# Patient Record
Sex: FEMALE | Race: ASIAN | NOT HISPANIC OR LATINO | Employment: FULL TIME | URBAN - METROPOLITAN AREA
[De-identification: names, ages, dates, MRNs, and addresses within clinical notes are randomized per-mention and may not be internally consistent; named-entity substitution may affect disease eponyms.]

---

## 2018-06-13 ENCOUNTER — OFFICE VISIT (OUTPATIENT)
Dept: FAMILY MEDICINE CLINIC | Facility: CLINIC | Age: 23
End: 2018-06-13
Payer: COMMERCIAL

## 2018-06-13 VITALS
HEART RATE: 86 BPM | DIASTOLIC BLOOD PRESSURE: 70 MMHG | BODY MASS INDEX: 22.42 KG/M2 | RESPIRATION RATE: 18 BRPM | TEMPERATURE: 98.5 F | HEIGHT: 59 IN | SYSTOLIC BLOOD PRESSURE: 106 MMHG | WEIGHT: 111.2 LBS

## 2018-06-13 DIAGNOSIS — Z11.3 SCREEN FOR STD (SEXUALLY TRANSMITTED DISEASE): ICD-10-CM

## 2018-06-13 DIAGNOSIS — B37.3 CANDIDA VAGINITIS: Primary | ICD-10-CM

## 2018-06-13 DIAGNOSIS — N89.8 VAGINAL DISCHARGE: ICD-10-CM

## 2018-06-13 PROBLEM — B37.9 CANDIDIASIS: Status: ACTIVE | Noted: 2018-06-13

## 2018-06-13 PROCEDURE — 87591 N.GONORRHOEAE DNA AMP PROB: CPT | Performed by: FAMILY MEDICINE

## 2018-06-13 PROCEDURE — 1036F TOBACCO NON-USER: CPT | Performed by: FAMILY MEDICINE

## 2018-06-13 PROCEDURE — 87491 CHLMYD TRACH DNA AMP PROBE: CPT | Performed by: FAMILY MEDICINE

## 2018-06-13 PROCEDURE — 99213 OFFICE O/P EST LOW 20 MIN: CPT | Performed by: FAMILY MEDICINE

## 2018-06-13 PROCEDURE — 3008F BODY MASS INDEX DOCD: CPT | Performed by: FAMILY MEDICINE

## 2018-06-13 PROCEDURE — 88175 CYTOPATH C/V AUTO FLUID REDO: CPT | Performed by: FAMILY MEDICINE

## 2018-06-13 RX ORDER — FLUCONAZOLE 150 MG/1
150 TABLET ORAL ONCE
Qty: 2 TABLET | Refills: 0 | Status: SHIPPED | OUTPATIENT
Start: 2018-06-13 | End: 2018-06-13

## 2018-06-13 NOTE — PATIENT INSTRUCTIONS
Take 1 dose of Diflucan today, and take an additional pill in 5 days  Please follow-up if symptoms worsen or do not improve

## 2018-06-13 NOTE — ASSESSMENT & PLAN NOTE
- Diflucan 150 mg once, and then repeat dose in 5 days   Advised to call if symptoms do not improve or worsen  - Discussed routine hygiene, advised to wear breathable, cotton underwear

## 2018-06-15 LAB
CHLAMYDIA DNA CVX QL NAA+PROBE: NORMAL
N GONORRHOEA DNA GENITAL QL NAA+PROBE: NORMAL

## 2018-06-18 LAB
LAB AP GYN PRIMARY INTERPRETATION: NORMAL
LAB AP LMP: NORMAL
Lab: NORMAL
PATH INTERP SPEC-IMP: NORMAL

## 2019-11-18 ENCOUNTER — HOSPITAL ENCOUNTER (EMERGENCY)
Facility: HOSPITAL | Age: 24
Discharge: HOME | End: 2019-11-18
Attending: EMERGENCY MEDICINE | Admitting: EMERGENCY MEDICINE
Payer: COMMERCIAL

## 2019-11-18 VITALS
BODY MASS INDEX: 22.52 KG/M2 | OXYGEN SATURATION: 100 % | HEART RATE: 109 BPM | SYSTOLIC BLOOD PRESSURE: 129 MMHG | TEMPERATURE: 99.3 F | WEIGHT: 110 LBS | DIASTOLIC BLOOD PRESSURE: 67 MMHG | RESPIRATION RATE: 18 BRPM

## 2019-11-18 DIAGNOSIS — N39.0 ACUTE URINARY TRACT INFECTION: Primary | ICD-10-CM

## 2019-11-18 LAB
BACTERIA UR QL AUTO: ABNORMAL /HPF
BILIRUB UR QL STRIP: NEGATIVE
CLARITY UR: ABNORMAL
COLOR UR: YELLOW
EXT PREG TEST URINE: NEGATIVE
EXT. CONTROL ED NAV: NORMAL
GLUCOSE UR STRIP-MCNC: NEGATIVE MG/DL
HGB UR QL STRIP.AUTO: ABNORMAL
KETONES UR STRIP-MCNC: NEGATIVE MG/DL
LEUKOCYTE ESTERASE UR QL STRIP: ABNORMAL
NITRITE UR QL STRIP: NEGATIVE
NON-SQ EPI CELLS URNS QL MICRO: ABNORMAL /HPF
PH UR STRIP.AUTO: 7.5 [PH] (ref 4.5–8)
PROT UR STRIP-MCNC: NEGATIVE MG/DL
RBC #/AREA URNS AUTO: ABNORMAL /HPF
SP GR UR STRIP.AUTO: 1.01 (ref 1–1.03)
UROBILINOGEN UR QL STRIP.AUTO: 0.2 E.U./DL
WBC #/AREA URNS AUTO: ABNORMAL /HPF

## 2019-11-18 PROCEDURE — 87591 N.GONORRHOEAE DNA AMP PROB: CPT | Performed by: PHYSICIAN ASSISTANT

## 2019-11-18 PROCEDURE — 99283 EMERGENCY DEPT VISIT LOW MDM: CPT

## 2019-11-18 PROCEDURE — 81001 URINALYSIS AUTO W/SCOPE: CPT

## 2019-11-18 PROCEDURE — 99284 EMERGENCY DEPT VISIT MOD MDM: CPT | Performed by: PHYSICIAN ASSISTANT

## 2019-11-18 PROCEDURE — 87491 CHLMYD TRACH DNA AMP PROBE: CPT | Performed by: PHYSICIAN ASSISTANT

## 2019-11-18 PROCEDURE — 81025 URINE PREGNANCY TEST: CPT | Performed by: PHYSICIAN ASSISTANT

## 2019-11-18 RX ORDER — NITROFURANTOIN 25; 75 MG/1; MG/1
100 CAPSULE ORAL EVERY 12 HOURS SCHEDULED
Qty: 10 CAPSULE | Refills: 0 | Status: SHIPPED | OUTPATIENT
Start: 2019-11-18 | End: 2019-11-23

## 2019-11-18 NOTE — DISCHARGE INSTRUCTIONS
Nhiê?m josselyn?ng Tiê?t niê?u ? ? Alex? n??   ? I?U TR? MICHELLE? I TRÚ:   Nhiê?m josselyn?ng ? ??ng tiê?t niê?u (NTTN)  là do vi khu?n xâm nh?p vào ? ??ng ti?t ni?u c?a b?n gây ra  H?u h?t vi khu?n xâm nh?p vào ? ??ng ti?t ni?u s? thoát ra ngoài khi b?n ?i ti?u  N?u vi khu?n v?n còn l?i saad ? ??ng ti?t ni?u, b?n có th? b? viêm  ? ? ??ng tiê?t niê?u maddi gô?m thâ?n, niê?u lu?n, ba?ng carmela va? niê?u ?a?o  N???c tiê?u ? ? ??c ta?o ra ?? thâ?n va? wing?y t?? niê?u lu?n ?ê?n ba?ng carmela  N??c ti?u thoát ra kh?i bàng carmela lu ni?u ??o  NTTN xa?y ra phô? biê?n h?n ?? ????ng tiê?t niê?u d???i, maddi gô?m ba?ng carmela va? niê?u ?a?o  Các tri?u ch?ng ph? bi?n maddi g?m nh? dominick:   · ?i ti?u amari?u h?n bình th??ng ho?c ?i ti?u saad khi ?ang ng? · ?au ho?c rát khi b?n ?i ti?u    · ?au ho?c t?c b?ng d??i     · N??c ti?u có mùi hôi    · Có máu saad n??c ti?u    · Són ti?u  Yêu c?u ch?m sóc ngay n?u:   · Ba?n ?i tiê?u râ?t i?t ho??c hâ?u nh? không ?i tiê?u  · Ba?n bi? sô?t monreal va? ru?ng mi?nh      · Ba?n bi? ?au s???n ho??c ?au l?ng jarrett?y ca? ng trâ?m tro?ng  Liên h? v?i nhà cung c?p d?ch v? ch?m sóc s?c kh?e c?a b?n n?u:   · Ba?n bi? sô?t     · B?n không c?m th?y khá h?n dominick 2 ngày dùng kháng sinh  · B?n nôn m?a      · B?n có th?c m?c ho?c lo ng?i v? b?nh lý ho?c vi?c ch?m sóc   ?i?u tr? NTTN  có th? maddi g?m các thu?c ?? ?i?u tr? amari?m khu?n  B?n c?ng có th? c?n thu?c ?? gi?m ?au và rát ho?c gi?m c?m giác mót ti?u th??ng xuyên  Phòng tránh NTTN:   · Th??ng xuyên ?i ti?u h?t   ?i ti?u và ti?u h?t ngay khi b?n th?y c?n  Không nh?n ti?u saad th?i shahzad dài  · [place] Deter t? tr??c ra dominick dominick khi b?n ti?u ti?n ho?c ?? i ti?n   Vi?c này giúp ng?n không cho m?m b?nh charlotte vào ???ng ti?u lu ni?u ?? o     · U?ng n??c alise ch? d?n  H?i xem b?n nên u?ng maddi nhiêu n? ?c m?i ngày và n? ?c gì t?t nh?t cho b?n  B?n có th? c?n u?ng amari?u n??c h?n bình th??ng ?? giúp ? ào th?i vi Larwance Saas u?ng r??u, cafêin ho?c n??c ép cam quýt   Nh?ng ch?t này có th? gây kích ?ng bàng carmela và t?ng các tri?u ch?ng c?a b?n  Nhà cung c?p d?ch v? ch?m sóc s?c kh?e c?a b?n có th? khuyên b?n u?ng n??c ép nam vi?t qu?t ?? giúp ng?n ch?n NTTN  · ?i ti?u yaima khi leigh ann h? tình d?c  Cách này có th? giúp ? ào th?i vi khu?n ? ??c truy? n saad khi leigh ann h? tình d?c     · Không th?t r?a ho?c s? d?ng n??c kh? mùi dành cho ph? n?   Vi?c này có th? làm thay ??i cân b?ng hóa h?c saad âm ? ?o c?a b?n     · Th??ng xuyên thay b?ng v? sinh ho?c tampon  Vi?c này giúp ng?n không cho m?m b?nh charlotte vào ???ng ti?u  · Th?c hi?n t?p luy?n c? x? ?ng ch?u th??ng xumarzena  Bài t?p c? x? ?ng ch?u có th? giúp b?n b?t ? ?u và d?ng ti?u  C? x??ng ch?u kh?e có th? giúp b?n ?i ti?u h?t d? dàng h?n  Si?t ch?t các c? này l?i saad 5 giây gi?ng nh? b?n ?ang nh?n ti?u  Yaima ?ó th? l?ng 5 giây  D?n d?n t?p si?t t?ng lên 10 giây  M?i ngày th?c hi?n 3 l?n, m?i l?n 15 nh?p ho?c alise ch? d?n  Tái khám v?i nhà cung c?p d?ch v? ch?m sóc s?c kh?e c?a b?n alise ch? d?n:  Ghi l?i câu h?i c?a b?n ?? b?n nh? h?i saad cu?c th?m thalia  © 2017 [employer] Information is for End User's use only and may not be sold, redistributed or otherwise used for commercial purposes  All illustrations and images included in CareNotes® are the copyrighted property of A D [employer]  or Agustín Horan  The above information is an  only  It is not intended as medical advice for individual conditions or treatments  Talk to your doctor, nurse or pharmacist before following any medical regimen to see if it is safe and effective for you

## 2019-11-18 NOTE — ED PROVIDER NOTES
History  Chief Complaint   Patient presents with    Possible UTI     Blood in urine and urgency for about 2 weeks  Pt denies burning  Cem Casas is a 21 y o  female who presents to the ED with complaints of urinary frequency, urinary urgency hematuria over the past week  Patient states she is sexually active but is not concerned for sexually transmitted infection  Denies abdominal pain, nausea, vomiting, diarrhea, vaginal discharge, vaginal bleeding, vaginal pain, pelvic pain, pain with intercourse, chest pain, shortness of breath, fever, chills  Patient states she took "natural supplements" without alleviation of symptoms  Denies new lubricants/lotions/detergents/soaps/feminine products  History provided by:  Patient  Difficulty Urinating   Urinary symptoms: frequent urination, hematuria and hesitancy    Urinary symptoms: no discolored urine, no foul-smelling urine and no bladder incontinence    Associated symptoms: no abdominal pain, no fever, no flank pain, no genital lesions, no nausea, no vaginal discharge and no vomiting    Risk factors: sexually active    Risk factors: no hx of pyelonephritis, not pregnant and no recurrent urinary tract infections        Prior to Admission Medications   Prescriptions Last Dose Informant Patient Reported? Taking? Multiple Vitamin (MULTI-VITAMINS PO)  Self Yes No   Sig: Take 1 tablet by mouth daily      Facility-Administered Medications: None       History reviewed  No pertinent past medical history  Past Surgical History:   Procedure Laterality Date    ABCESS DRAINAGE      Appendical Abscess       Family History   Problem Relation Age of Onset    Asthma Father      I have reviewed and agree with the history as documented      Social History     Tobacco Use    Smoking status: Never Smoker    Smokeless tobacco: Never Used   Substance Use Topics    Alcohol use: No    Drug use: No        Review of Systems   Constitutional: Negative for appetite change, chills, fever and unexpected weight change  HENT: Negative for congestion, drooling, ear pain, rhinorrhea, sore throat, trouble swallowing and voice change  Eyes: Negative for pain, discharge, redness and visual disturbance  Respiratory: Negative for cough, shortness of breath, wheezing and stridor  Cardiovascular: Negative for chest pain, palpitations and leg swelling  Gastrointestinal: Negative for abdominal pain, blood in stool, constipation, diarrhea, nausea and vomiting  Genitourinary: Positive for decreased urine volume, difficulty urinating, frequency and urgency  Negative for dyspareunia, dysuria, enuresis, flank pain, genital sores, hematuria, menstrual problem, pelvic pain, vaginal bleeding, vaginal discharge and vaginal pain  Musculoskeletal: Negative for gait problem, joint swelling, neck pain and neck stiffness  Skin: Negative for color change and rash  Neurological: Negative for dizziness, seizures, light-headedness and headaches  Physical Exam  Physical Exam   Constitutional: She is oriented to person, place, and time  She appears well-developed and well-nourished  HENT:   Head: Normocephalic and atraumatic  Nose: Nose normal    Mouth/Throat: Oropharynx is clear and moist    Eyes: Pupils are equal, round, and reactive to light  Conjunctivae and EOM are normal    Cardiovascular: Normal rate, regular rhythm and intact distal pulses  Pulmonary/Chest: Effort normal and breath sounds normal    Abdominal: Normal appearance and bowel sounds are normal  There is no tenderness  There is no CVA tenderness  Musculoskeletal: Normal range of motion  Neurological: She is alert and oriented to person, place, and time  Skin: Skin is warm and dry  Capillary refill takes less than 2 seconds  Psychiatric: She has a normal mood and affect  Nursing note and vitals reviewed        Vital Signs  ED Triage Vitals [11/18/19 1211]   Temperature Pulse Respirations Blood Pressure SpO2 99 3 °F (37 4 °C) (!) 109 18 129/67 100 %      Temp Source Heart Rate Source Patient Position - Orthostatic VS BP Location FiO2 (%)   Oral Monitor Sitting Right arm --      Pain Score       No Pain           Vitals:    11/18/19 1211   BP: 129/67   Pulse: (!) 109   Patient Position - Orthostatic VS: Sitting         Visual Acuity      ED Medications  Medications - No data to display    Diagnostic Studies  Results Reviewed     Procedure Component Value Units Date/Time    Urine Microscopic [[id number]]  (Abnormal) Collected:  11/18/19 1258    Lab Status:  Final result Specimen:  Urine Updated:  11/18/19 1342     RBC, UA 20-30 /hpf      WBC, UA 2-4 /hpf      Epithelial Cells Occasional /hpf      Bacteria, UA Occasional /hpf     Chlamydia/GC amplified DNA by PCR [[id number]] Collected:  11/18/19 1301    Lab Status: In process Specimen:  Urine, Other Updated:  11/18/19 1301    POCT pregnancy, urine [[id number]]  (Normal) Resulted:  11/18/19 1301    Lab Status:  Final result Updated:  11/18/19 1301     EXT PREG TEST UR (Ref: Negative) negative     Control valid    Urine Macroscopic, POC [[id number]]  (Abnormal) Collected:  11/18/19 1258    Lab Status:  Final result Specimen:  Urine Updated:  11/18/19 1259     Color, UA Yellow     Clarity, UA Cloudy     pH, UA 7 5     Leukocytes, UA Small     Nitrite, UA Negative     Protein, UA Negative mg/dl      Glucose, UA Negative mg/dl      Ketones, UA Negative mg/dl      Urobilinogen, UA 0 2 E U /dl      Bilirubin, UA Negative     Blood, UA Large     Specific Effie, UA 1 015    Narrative:       CLINITEK RESULT                 No orders to display              Procedures  Procedures       ED Course  ED Course as of Nov 18 1418   Mon Nov 18, 2019   1326 Educated patient regarding diagnosis and management  Advised patient to follow up with PCP  Advised patient to RTER for persistent or worsening symptoms                                     MDM  Number of Diagnoses or Management Options  Acute urinary tract infection: new and does not require workup  Diagnosis management comments: Lanre Saha is a 21 y o  female who presents to the ED with complaints of urinary frequency, urinary urgency hematuria over the past week  Patient states she is sexually active but is not concerned for sexually transmitted infection  Denies abdominal pain, nausea, vomiting, diarrhea, vaginal discharge, vaginal bleeding, chest pain, shortness of breath, fever, chills  Patient states she took "natural supplements" without alleviation of symptoms  Urinalysis with small leukocyte esterase and large blood  Symptomatic female  Will treat for urinary tract infection  Will send for testing for chlamydia or gonorrhea given sexually active status and lack of gynecological care  I provided patient with strict RTER precautions  I advised patient follow-up with PCP in 24-48 hours  Patient verbalized understanding  Amount and/or Complexity of Data Reviewed  Clinical lab tests: reviewed and ordered  Review and summarize past medical records: yes    Risk of Complications, Morbidity, and/or Mortality  Presenting problems: low  Diagnostic procedures: low  Management options: low    Patient Progress  Patient progress: improved      Disposition  Final diagnoses:   Acute urinary tract infection     Time reflects when diagnosis was documented in both MDM as applicable and the Disposition within this note     Time User Action Codes Description Comment    11/18/2019  1:08 PM Delorse Hodgkins Add [N39 0] Acute urinary tract infection       ED Disposition     ED Disposition Condition Date/Time Comment    Discharge Stable Mon Nov 18, 2019  1:11 PM Tayla Amador discharge to home/self care              Follow-up Information     Follow up With Specialties Details Why Contact Info Additional [address] Emergency Department Emergency Medicine Go to  If symptoms worsen Anna [address] [facility]  [phone] [facility] ED, [address], [place], [place], [address] [facility] Schedule an appointment as soon as possible for a visit   [address]  [id number] [phone] [facility], [address], [address] [phone]          Discharge Medication List as of 11/18/2019  1:11 PM      START taking these medications    Details   nitrofurantoin (MACROBID) 100 mg capsule Take 1 capsule (100 mg total) by mouth every 12 (twelve) hours for 5 days, Starting Mon 11/18/2019, Until Sat 11/23/2019, Print         CONTINUE these medications which have NOT CHANGED    Details   Multiple Vitamin (MULTI-VITAMINS PO) Take 1 tablet by mouth daily, Starting Tue 10/28/2014, Historical Med           No discharge procedures on file      ED Provider  Electronically Signed by           Harman Mcdonnell PA-C  11/18/19 2650

## 2019-11-19 LAB
C TRACH DNA SPEC QL NAA+PROBE: NEGATIVE
N GONORRHOEA DNA SPEC QL NAA+PROBE: NEGATIVE

## 2020-08-06 ENCOUNTER — TELEPHONE (OUTPATIENT)
Dept: FAMILY MEDICINE CLINIC | Facility: CLINIC | Age: 25
End: 2020-08-06

## 2020-08-07 ENCOUNTER — OFFICE VISIT (OUTPATIENT)
Dept: FAMILY MEDICINE CLINIC | Facility: CLINIC | Age: 25
End: 2020-08-07

## 2020-08-07 VITALS
HEART RATE: 70 BPM | BODY MASS INDEX: 21.77 KG/M2 | DIASTOLIC BLOOD PRESSURE: 70 MMHG | TEMPERATURE: 99.1 F | WEIGHT: 108 LBS | RESPIRATION RATE: 16 BRPM | SYSTOLIC BLOOD PRESSURE: 118 MMHG | HEIGHT: 59 IN

## 2020-08-07 DIAGNOSIS — Z30.09 BIRTH CONTROL COUNSELING: Primary | ICD-10-CM

## 2020-08-07 DIAGNOSIS — Z30.42 DEPO-PROVERA CONTRACEPTIVE STATUS: ICD-10-CM

## 2020-08-07 LAB — SL AMB POCT URINE HCG: NEGATIVE

## 2020-08-07 PROCEDURE — 96372 THER/PROPH/DIAG INJ SC/IM: CPT

## 2020-08-07 PROCEDURE — 99213 OFFICE O/P EST LOW 20 MIN: CPT | Performed by: FAMILY MEDICINE

## 2020-08-07 PROCEDURE — 3008F BODY MASS INDEX DOCD: CPT | Performed by: FAMILY MEDICINE

## 2020-08-07 PROCEDURE — 81025 URINE PREGNANCY TEST: CPT | Performed by: FAMILY MEDICINE

## 2020-08-07 PROCEDURE — 1036F TOBACCO NON-USER: CPT | Performed by: FAMILY MEDICINE

## 2020-08-07 RX ORDER — MEDROXYPROGESTERONE ACETATE 150 MG/ML
150 INJECTION, SUSPENSION INTRAMUSCULAR ONCE
Status: COMPLETED | OUTPATIENT
Start: 2020-08-07 | End: 2020-08-07

## 2020-08-07 RX ADMIN — MEDROXYPROGESTERONE ACETATE 150 MG: 150 INJECTION, SUSPENSION INTRAMUSCULAR at 09:30

## 2020-08-07 NOTE — ASSESSMENT & PLAN NOTE
Patient reports need for contraception counseling   -Currently has a stable relationship with boyfriend  She uses barrier contraception (condoms) occasionally    -Counseled on different contraceptive methods: natural planning, barrier methods, Nexplanon, Depo shot, IUDs  She reports she would like to try Depo shot at this time  Side effects like spotting, intermenstrual bleeding, weight gain, etc discussed with patient   -Advised to use condoms to avoid STDs since this is only to prevent pregnancy    -Urine hCG negative at this visit  -Depo shot given   RTC in 3 months

## 2020-08-07 NOTE — PROGRESS NOTES
Assessment/Plan:    Birth control counseling  Patient reports need for contraception counseling   -Currently has a stable relationship with boyfriend  She uses barrier contraception (condoms) occasionally    -Counseled on different contraceptive methods: natural planning, barrier methods, Nexplanon, Depo shot, IUDs  She reports she would like to try Depo shot at this time  Side effects like spotting, intermenstrual bleeding, weight gain, etc discussed with patient   -Advised to use condoms to avoid STDs since this is only to prevent pregnancy    -Urine hCG negative at this visit  -Depo shot given  RTC in 3 months         Problem List Items Addressed This Visit        Other    Birth control counseling - Primary     Patient reports need for contraception counseling   -Currently has a stable relationship with boyfriend  She uses barrier contraception (condoms) occasionally    -Counseled on different contraceptive methods: natural planning, barrier methods, Nexplanon, Depo shot, IUDs  She reports she would like to try Depo shot at this time  Side effects like spotting, intermenstrual bleeding, weight gain, etc discussed with patient   -Advised to use condoms to avoid STDs since this is only to prevent pregnancy    -Urine hCG negative at this visit  -Depo shot given  RTC in 3 months         Relevant Medications    medroxyPROGESTERone (DEPO-PROVERA) IM injection 150 mg (Start on 8/7/2020 10:15 AM)    Other Relevant Orders    POCT urine HCG (Completed)      Other Visit Diagnoses     Depo-Provera contraceptive status        Relevant Medications    medroxyPROGESTERone (DEPO-PROVERA) IM injection 150 mg (Start on 8/7/2020 10:15 AM)            Subjective:      Patient ID: Amaury Loera is a 31 y o  female  HPI     Patient presents for birth control counseling  Patient reports having a stable relationship with boyfriend, used condoms in the past, but since she doesn't like it she states he pulls out prior to ejaculation   She didn't use any other methods in the past  She would like to avoid any intravaginal device  She read about Nexplanon but would like full counseling on other options  The following portions of the patient's history were reviewed and updated as appropriate:   She  has no past medical history on file  She  has a past surgical history that includes Abscess drainage  She  reports that she has never smoked  She has never used smokeless tobacco  She reports that she does not drink alcohol or use drugs  Current Outpatient Medications   Medication Sig Dispense Refill    Multiple Vitamin (MULTI-VITAMINS PO) Take 1 tablet by mouth daily       Current Facility-Administered Medications   Medication Dose Route Frequency Provider Last Rate Last Dose    medroxyPROGESTERone (DEPO-PROVERA) IM injection 150 mg  150 mg Intramuscular Once Kimberlee Harada, MD         She has No Known Allergies       Review of Systems   Constitutional: Negative for appetite change, fatigue and fever  Cardiovascular: Negative for chest pain  Gastrointestinal: Negative for abdominal distention, abdominal pain, constipation, diarrhea, nausea and vomiting  Genitourinary: Negative for difficulty urinating, frequency, menstrual problem and urgency  Musculoskeletal: Negative for back pain  Neurological: Negative for headaches  Psychiatric/Behavioral: The patient is not nervous/anxious  Objective:      /70 (BP Location: Left arm, Patient Position: Sitting, Cuff Size: Large)   Pulse 70   Temp 99 1 °F (37 3 °C) (Tympanic)   Resp 16   Ht 4' 11" (1 499 m)   Wt 49 kg (108 lb)   BMI 21 81 kg/m²          Physical Exam   Constitutional: She is oriented to person, place, and time  She appears well-developed  No distress  HENT:   Head: Normocephalic and atraumatic  Neck: Normal range of motion  Neck supple  No thyromegaly present  Cardiovascular: Normal rate, regular rhythm and normal heart sounds   Exam reveals no gallop and no friction rub  No murmur heard  Pulmonary/Chest: Effort normal and breath sounds normal  No respiratory distress  She has no wheezes  She has no rales  She exhibits no tenderness  Abdominal: Soft  Bowel sounds are normal  She exhibits no distension and no mass  There is no abdominal tenderness  There is no rebound and no guarding  Musculoskeletal: Normal range of motion  General: No tenderness or deformity  Lymphadenopathy:     She has no cervical adenopathy  Neurological: She is alert and oriented to person, place, and time  Skin: Skin is warm and dry  No rash noted  She is not diaphoretic  No erythema  Psychiatric: Her behavior is normal  Judgment and thought content normal    Vitals reviewed

## 2020-11-24 ENCOUNTER — OFFICE VISIT (OUTPATIENT)
Dept: FAMILY MEDICINE CLINIC | Facility: CLINIC | Age: 25
End: 2020-11-24

## 2020-11-24 VITALS
WEIGHT: 107 LBS | RESPIRATION RATE: 18 BRPM | TEMPERATURE: 99.5 F | HEART RATE: 110 BPM | DIASTOLIC BLOOD PRESSURE: 80 MMHG | HEIGHT: 59 IN | SYSTOLIC BLOOD PRESSURE: 112 MMHG | BODY MASS INDEX: 21.57 KG/M2

## 2020-11-24 DIAGNOSIS — R30.0 DYSURIA: Primary | ICD-10-CM

## 2020-11-24 DIAGNOSIS — N91.2 AMENORRHEA: ICD-10-CM

## 2020-11-24 DIAGNOSIS — B37.9 CANDIDIASIS: ICD-10-CM

## 2020-11-24 LAB
SL AMB  POCT GLUCOSE, UA: NEGATIVE
SL AMB LEUKOCYTE ESTERASE,UA: NEGATIVE
SL AMB POCT BILIRUBIN,UA: NEGATIVE
SL AMB POCT BLOOD,UA: ABNORMAL
SL AMB POCT CLARITY,UA: CLEAR
SL AMB POCT COLOR,UA: YELLOW
SL AMB POCT KETONES,UA: NEGATIVE
SL AMB POCT NITRITE,UA: NEGATIVE
SL AMB POCT PH,UA: 7.5
SL AMB POCT SPECIFIC GRAVITY,UA: 1.01
SL AMB POCT URINE HCG: NEGATIVE
SL AMB POCT URINE PROTEIN: NEGATIVE
SL AMB POCT UROBILINOGEN: 0.2

## 2020-11-24 PROCEDURE — 3725F SCREEN DEPRESSION PERFORMED: CPT | Performed by: FAMILY MEDICINE

## 2020-11-24 PROCEDURE — 81025 URINE PREGNANCY TEST: CPT | Performed by: FAMILY MEDICINE

## 2020-11-24 PROCEDURE — 81002 URINALYSIS NONAUTO W/O SCOPE: CPT | Performed by: FAMILY MEDICINE

## 2020-11-24 PROCEDURE — 3008F BODY MASS INDEX DOCD: CPT | Performed by: FAMILY MEDICINE

## 2020-11-24 PROCEDURE — 99213 OFFICE O/P EST LOW 20 MIN: CPT | Performed by: FAMILY MEDICINE

## 2020-11-24 PROCEDURE — 1036F TOBACCO NON-USER: CPT | Performed by: FAMILY MEDICINE

## 2020-11-24 RX ORDER — FLUCONAZOLE 150 MG/1
150 TABLET ORAL ONCE
Qty: 1 TABLET | Refills: 0 | Status: SHIPPED | OUTPATIENT
Start: 2020-11-24 | End: 2020-11-24

## 2020-12-30 ENCOUNTER — OFFICE VISIT (OUTPATIENT)
Dept: FAMILY MEDICINE CLINIC | Facility: CLINIC | Age: 25
End: 2020-12-30

## 2020-12-30 VITALS
TEMPERATURE: 98.3 F | OXYGEN SATURATION: 99 % | SYSTOLIC BLOOD PRESSURE: 120 MMHG | BODY MASS INDEX: 22.46 KG/M2 | DIASTOLIC BLOOD PRESSURE: 72 MMHG | HEART RATE: 85 BPM | RESPIRATION RATE: 16 BRPM | WEIGHT: 111.2 LBS

## 2020-12-30 DIAGNOSIS — B96.89 BACTERIAL VAGINOSIS: Primary | ICD-10-CM

## 2020-12-30 DIAGNOSIS — N76.0 BACTERIAL VAGINOSIS: Primary | ICD-10-CM

## 2020-12-30 DIAGNOSIS — N89.8 VAGINAL ITCHING: ICD-10-CM

## 2020-12-30 LAB
BV WHIFF TEST VAG QL: NORMAL
CLUE CELLS SPEC QL WET PREP: NEGATIVE
PH SMN: NORMAL [PH]
SL AMB POCT WET MOUNT: NEGATIVE
T VAGINALIS VAG QL WET PREP: NEGATIVE
YEAST VAG QL WET PREP: NEGATIVE

## 2020-12-30 PROCEDURE — 87210 SMEAR WET MOUNT SALINE/INK: CPT | Performed by: FAMILY MEDICINE

## 2020-12-30 PROCEDURE — 99214 OFFICE O/P EST MOD 30 MIN: CPT | Performed by: FAMILY MEDICINE

## 2020-12-30 PROCEDURE — 1036F TOBACCO NON-USER: CPT | Performed by: FAMILY MEDICINE

## 2020-12-30 RX ORDER — METRONIDAZOLE 500 MG/1
500 TABLET ORAL EVERY 12 HOURS SCHEDULED
Qty: 10 TABLET | Refills: 0 | Status: SHIPPED | OUTPATIENT
Start: 2020-12-30 | End: 2021-01-04

## 2021-01-04 ENCOUNTER — TELEMEDICINE (OUTPATIENT)
Dept: FAMILY MEDICINE CLINIC | Facility: CLINIC | Age: 26
End: 2021-01-04

## 2021-01-04 DIAGNOSIS — N76.0 BACTERIAL VAGINOSIS: ICD-10-CM

## 2021-01-04 DIAGNOSIS — N76.0 BACTERIAL VAGINOSIS: Primary | ICD-10-CM

## 2021-01-04 DIAGNOSIS — B96.89 BACTERIAL VAGINOSIS: ICD-10-CM

## 2021-01-04 DIAGNOSIS — B96.89 BACTERIAL VAGINOSIS: Primary | ICD-10-CM

## 2021-01-04 PROCEDURE — 99212 OFFICE O/P EST SF 10 MIN: CPT | Performed by: FAMILY MEDICINE

## 2021-01-04 RX ORDER — METRONIDAZOLE 500 MG/1
TABLET ORAL
Qty: 10 TABLET | Refills: 0 | OUTPATIENT
Start: 2021-01-04

## 2021-01-04 NOTE — ASSESSMENT & PLAN NOTE
Improving after 5 day course of Flagyl  - Discussed with pt that symptoms should continue to gradually improve over the next week  - If worsening of discharge or vaginal irritation advised pt to follow-up later this week for further workup/treatment

## 2021-01-04 NOTE — PROGRESS NOTES
Virtual Brief Visit    Assessment/Plan:    Problem List Items Addressed This Visit        Genitourinary    Bacterial vaginosis - Primary     Improving after 5 day course of Flagyl  - Discussed with pt that symptoms should continue to gradually improve over the next week  - If worsening of discharge or vaginal irritation advised pt to follow-up later this week for further workup/treatment                     Reason for visit is   Chief Complaint   Patient presents with   Julieann Frankel Virtual Brief Visit        Encounter provider Laura Fuentes MD    Provider located at [address] [address] [phone]    Recent Visits  Date Type Provider Dept   12/30/20 Office Visit Reina Vidal MD [facility] recent visits within past 7 days and meeting all other requirements     Today's Visits  Date Type Provider Dept   01/04/21 Telemedicine Nathalia Jerome 11 today's visits and meeting all other requirements     Future Appointments  No visits were found meeting these conditions  Showing future appointments within next 150 days and meeting all other requirements        After connecting through telephone, the patient was identified by name and date of birth  Aniceto Liao was informed that this is a telemedicine visit and that the visit is being conducted through telephone  My office door was closed  No one else was in the room  She acknowledged consent and understanding of privacy and security of the platform  The patient has agreed to participate and understands she can discontinue the visit at any time  Patient is aware this is a billable service  Wayne Iglesias is a 31 y o  female :  Pleasant 50-year-old female calling for follow-up of recent treatment of vaginal itchiness and irritation    She reports that after taking the Flagyl that most of her symptoms have improved and that she has been monitoring her feminine hygiene and washing with scent-free soap  She denies any symptoms of dysuria at this time  She says the vaginal discharge has greatly improved as well, but she still does have some vaginal discharge at this time  No past medical history on file  Past Surgical History:   Procedure Laterality Date    ABCESS DRAINAGE      Appendical Abscess       Current Outpatient Medications   Medication Sig Dispense Refill    metroNIDAZOLE (FLAGYL) 500 mg tablet Take 1 tablet (500 mg total) by mouth every 12 (twelve) hours for 5 days 10 tablet 0    Multiple Vitamin (MULTI-VITAMINS PO) Take 1 tablet by mouth daily       No current facility-administered medications for this visit  No Known Allergies    Review of Systems   Constitutional: Negative  HENT: Negative  Respiratory: Negative  Cardiovascular: Negative  Gastrointestinal: Negative  Genitourinary: Positive for vaginal discharge  Negative for difficulty urinating, dyspareunia, dysuria, hematuria, pelvic pain, vaginal bleeding and vaginal pain  Musculoskeletal: Negative for arthralgias and back pain  Neurological: Negative for dizziness, light-headedness and headaches  Psychiatric/Behavioral: The patient is not nervous/anxious  There were no vitals filed for this visit  It was my intent to perform this visit via video technology but the patient was not able to do a video connection so the visit was completed via audio telephone only  I spent 11 minutes directly with the patient during this visit    [place] acknowledges that she has consented to an online visit or consultation  She understands that the online visit is based solely on information provided by her, and that, in the absence of a face-to-face physical evaluation by the physician, the diagnosis she receives is both limited and provisional in terms of accuracy and completeness   This is not intended to replace a full medical face-to-face evaluation by the physician  Alejandrina Parada understands and accepts these terms

## 2021-05-08 ENCOUNTER — IMMUNIZATIONS (OUTPATIENT)
Dept: FAMILY MEDICINE CLINIC | Facility: HOSPITAL | Age: 26
End: 2021-05-08

## 2021-05-08 DIAGNOSIS — Z23 ENCOUNTER FOR IMMUNIZATION: Primary | ICD-10-CM

## 2021-05-08 PROCEDURE — 0001A SARS-COV-2 / COVID-19 MRNA VACCINE (PFIZER-BIONTECH) 30 MCG: CPT

## 2021-05-08 PROCEDURE — 91300 SARS-COV-2 / COVID-19 MRNA VACCINE (PFIZER-BIONTECH) 30 MCG: CPT

## 2021-05-29 ENCOUNTER — IMMUNIZATIONS (OUTPATIENT)
Dept: FAMILY MEDICINE CLINIC | Facility: HOSPITAL | Age: 26
End: 2021-05-29

## 2021-05-29 DIAGNOSIS — Z23 ENCOUNTER FOR IMMUNIZATION: Primary | ICD-10-CM

## 2021-05-29 PROCEDURE — 0002A SARS-COV-2 / COVID-19 MRNA VACCINE (PFIZER-BIONTECH) 30 MCG: CPT

## 2021-05-29 PROCEDURE — 91300 SARS-COV-2 / COVID-19 MRNA VACCINE (PFIZER-BIONTECH) 30 MCG: CPT

## 2022-08-24 NOTE — PROGRESS NOTES
Earnest Palmer 1995 female MRN: [id number]    Family Medicine Acute Visit    ASSESSMENT/PLAN  Problem List Items Addressed This Visit        Other    Candidiasis - Primary     - Diflucan 150 mg once, and then repeat dose in 5 days  Advised to call if symptoms do not improve or worsen  - Discussed routine hygiene, advised to wear breathable, cotton underwear           Relevant Medications    fluconazole (DIFLUCAN) 150 mg tablet      Other Visit Diagnoses     Vaginal discharge        Relevant Medications    fluconazole (DIFLUCAN) 150 mg tablet    Other Relevant Orders    Chlamydia/GC amplified DNA by PCR    Liquid-based pap, diagnostic    Screen for STD (sexually transmitted disease)        Relevant Orders    HIV 1/2 AG-AB combo    RPR                No future appointments  SUBJECTIVE  CC: Vaginal Discharge and Vaginal Itching      HPI:  Earnest Palmer is a 24 y o  female who presents for vaginal itching and discharge that began 2-3 days ago  She states that symptoms began as burning with urination and a feeling of irritation or swelling of her vaginal  Yesterday she noticed a clumpy white discharge from her vaginal  She states she has never had symptoms like this before  She states otherwise she is feeling well, denies fever or chills  She states she is sexually active and has never been tested for STDs  She has never had a Pap smear  Review of Systems   Constitutional: Negative for chills and fever  Gastrointestinal: Negative for abdominal pain  Genitourinary: Positive for dysuria and vaginal discharge  Negative for decreased urine volume, difficulty urinating, hematuria, pelvic pain and vaginal bleeding  Historical Information   The patient history was reviewed as follows:  History reviewed  No pertinent past medical history        Past Surgical History:   Procedure Laterality Date    ABCESS DRAINAGE      Appendical Abscess     Family History   Problem Relation Age of Onset    Asthma Father Social History   History   Alcohol Use No     History   Drug Use No     History   Smoking Status    Never Smoker   Smokeless Tobacco    Never Used       Medications:     Current Outpatient Prescriptions:     Multiple Vitamin (MULTI-VITAMINS PO), Take 1 tablet by mouth daily, Disp: , Rfl:     fluconazole (DIFLUCAN) 150 mg tablet, Take 1 tablet (150 mg total) by mouth once for 1 dose, Disp: 2 tablet, Rfl: 0    No Known Allergies    OBJECTIVE  Vitals:   Vitals:    06/13/18 1505   BP: 106/70   Pulse: 86   Resp: 18   Temp: 98 5 °F (36 9 °C)   Weight: 50 4 kg (111 lb 3 2 oz)   Height: 4' 10 6" (1 488 m)         Physical Exam   Constitutional: She is oriented to person, place, and time  She appears well-developed and well-nourished  No distress  Pulmonary/Chest: Effort normal    Genitourinary: Pelvic exam was performed with patient supine  There is no rash on the right labia  There is no rash on the left labia  Cervix exhibits no motion tenderness, no discharge and no friability  No erythema or bleeding in the vagina  No foreign body in the vagina  Vaginal discharge found  Genitourinary Comments: Clumpy white discharge present within vaginal canal   Neurological: She is alert and oriented to person, place, and time  She exhibits normal muscle tone  Skin: Skin is warm  She is not diaphoretic  Psychiatric: She has a normal mood and affect  Her behavior is normal  Thought content normal    Vitals reviewed       KOH mount- Numerous hyphae visualized with budding, suggestive of vaginal yeast infection     Bhavna Maldonado DO, PGY-1  [facility]   6/13/2018 Niacinamide Counseling: I recommended taking niacin or niacinamide, also know as vitamin B3, twice daily. Recent evidence suggests that taking vitamin B3 (500 mg twice daily) can reduce the risk of actinic keratoses and non-melanoma skin cancers. Side effects of vitamin B3 include flushing and headache.

## 2023-04-24 ENCOUNTER — ANNUAL EXAM (OUTPATIENT)
Dept: FAMILY MEDICINE CLINIC | Facility: CLINIC | Age: 28
End: 2023-04-24

## 2023-04-24 VITALS
HEART RATE: 109 BPM | HEIGHT: 59 IN | SYSTOLIC BLOOD PRESSURE: 112 MMHG | RESPIRATION RATE: 18 BRPM | TEMPERATURE: 99 F | WEIGHT: 115 LBS | BODY MASS INDEX: 23.18 KG/M2 | DIASTOLIC BLOOD PRESSURE: 78 MMHG | OXYGEN SATURATION: 100 %

## 2023-04-24 DIAGNOSIS — Z72.51 UNPROTECTED SEX: ICD-10-CM

## 2023-04-24 DIAGNOSIS — Z32.01 POSITIVE PREGNANCY TEST: Primary | ICD-10-CM

## 2023-04-24 DIAGNOSIS — Z12.4 CERVICAL CANCER SCREENING: ICD-10-CM

## 2023-04-24 DIAGNOSIS — Z11.3 ROUTINE SCREENING FOR STI (SEXUALLY TRANSMITTED INFECTION): ICD-10-CM

## 2023-04-24 DIAGNOSIS — Z3A.01 LESS THAN 8 WEEKS GESTATION OF PREGNANCY: ICD-10-CM

## 2023-04-24 LAB
BILIRUB UR QL STRIP: NEGATIVE
CLARITY UR: CLEAR
COLOR UR: COLORLESS
GLUCOSE UR STRIP-MCNC: NEGATIVE MG/DL
HGB UR QL STRIP.AUTO: NEGATIVE
KETONES UR STRIP-MCNC: NEGATIVE MG/DL
LEUKOCYTE ESTERASE UR QL STRIP: NEGATIVE
NITRITE UR QL STRIP: NEGATIVE
PH UR STRIP.AUTO: 6.5 [PH]
PROT UR STRIP-MCNC: NEGATIVE MG/DL
SL AMB POCT URINE HCG: POSITIVE
SP GR UR STRIP.AUTO: 1 (ref 1–1.03)
UROBILINOGEN UR STRIP-ACNC: <2 MG/DL

## 2023-04-24 NOTE — PROGRESS NOTES
Routine GYNECOLOGICAL Examination    1  Routine well woman exam done today  2  Pap:  The patient's last pap was never  Pap was done today  Current ASCCP Guidelines reviewed  3   STD testing  was done, GC/CT PCR sent with thin prep  4  The following were reviewed in today's visit: breast self exam, STD testing and family planning choices  6  Pregnancy- Urine hcg positive in office today , home pregnancy test positive yesterday  Unexpected but desired pregnancy  LMP 3/11-3/15, did have some spotting on 23  Pt would like to stay with our clinic for prenatal care- planning to follow with Dr Mandy Covington  General prenatal counseling provided  Ordered prenatal panel labs and dating ultrasound with [facility]  Nuhanasima Monsalve is a 28 y o  female who presents for annual well woman exam        GYN:  · Denies vaginal discharge, labial erythema or lesions, dyspareunia  · Menarche at 14years old  · Menses previously regular but most recent  just had spotting   · periods are regular  · no unusual pelvic pain  · no unusual vaginal discharge  · no previous abnormal Pap tests  · no family or personal history of cervical cancer  · Contraception: none  · Patient is sexually active with one male partner, boyfriend  · Gynecologic surgeries: Denies  · Patient does not have a family history of breast, endometrial, or ovarian cancer  · Cervical cancer: Last pap done never  · STD screening: Very low risk of STD exposure      OB:  ·  female  No history of pregnancy or abortions       :  · Denies dysuria, urinary frequency or urgency  · Denies hematuria, flank pain, incontinence  Breast:  · Denies breast mass, skin changes, dimpling, reddening, nipple retraction  · Does have some breast tenderness  · Denies breast discharge  · Patient does do monthly breast exams  Review of Systems   Constitutional: Negative for chills and fever  HENT: Negative for ear pain and sore throat      Eyes: Negative for pain and visual disturbance  Respiratory: Negative for cough and shortness of breath  Cardiovascular: Negative for chest pain and palpitations  Gastrointestinal: Negative for abdominal pain and vomiting  Genitourinary: Positive for menstrual problem and vaginal discharge  Negative for dysuria and hematuria  Breast tenderness   Musculoskeletal: Negative for arthralgias and back pain  Skin: Negative for color change and rash  Neurological: Negative for seizures, syncope and headaches  Psychiatric/Behavioral: Negative for agitation and confusion  All other systems reviewed and are negative  Objective   Vitals:    04/24/23 1629   BP: 112/78   Pulse: (!) 109   Resp: 18   Temp: 99 °F (37 2 °C)   SpO2: 100%         Physical Exam  Vitals reviewed  Exam conducted with a chaperone present  Constitutional:       General: She is not in acute distress  Appearance: She is well-developed  HENT:      Head: Normocephalic and atraumatic  Eyes:      Extraocular Movements: Extraocular movements intact  Conjunctiva/sclera: Conjunctivae normal    Cardiovascular:      Rate and Rhythm: Normal rate and regular rhythm  Pulmonary:      Effort: Pulmonary effort is normal  No respiratory distress  Abdominal:      Tenderness: There is no abdominal tenderness  Genitourinary:     General: Normal vulva  Vagina: Vaginal discharge present  Cervix: Dilated  Discharge (mucous discharge from cervical os) present  Comments: Speculum exam and pap performed today   Musculoskeletal:         General: No tenderness or deformity  Normal range of motion  Cervical back: Normal range of motion and neck supple  Skin:     General: Skin is warm and dry  Findings: No rash  Neurological:      General: No focal deficit present  Mental Status: She is alert  Mental status is at baseline     Psychiatric:         Mood and Affect: Mood normal          Behavior: Behavior normal  Macrina Dixon MD  Family Medicine Resident, PGY-3  04/24/23

## 2023-04-24 NOTE — PATIENT INSTRUCTIONS
First Trimester Pregnancy   AMBULATORY CARE:   The first trimester of pregnancy  lasts from your last period through the 12th week of pregnancy  Follow up with your healthcare providers for prenatal care  Regular prenatal care can help to keep you and your baby healthy  Seek care immediately if:   You have pain or cramping in your abdomen or low back  You have severe vaginal bleeding or clotting  Call your doctor or obstetrician if:   You have light bleeding  You have chills or a fever  You have vaginal itching, burning, or pain  You have yellow, green, white, or foul-smelling vaginal discharge  You have pain or burning when you urinate, less urine than usual, or pink or bloody urine  You have questions or concerns about your condition or care  Prenatal care:  Prenatal care is a series of visits with your healthcare provider throughout your pregnancy  Prenatal care can help prevent problems during pregnancy and childbirth  At each prenatal visit, your healthcare provider will weigh you and check your blood pressure  Your healthcare provider will also check your baby's heartbeat and growth  You may also need the following at some visits:  A pelvic exam  allows your healthcare provider to see your cervix (the bottom part of your uterus)  Your healthcare provider will use a speculum to open your vagina  He or she will check the size and shape of your uterus  Blood tests  may be done to check for any of the following:    Gestational diabetes or anemia (low iron level)    Blood type or Rh factor, or certain birth defects    Immunity to certain diseases, such as chickenpox or rubella    An infection, such as a sexually transmitted infection, HIV, or hepatitis B    Hepatitis B  may need to be prevented or treated  Hepatitis B is inflammation of the liver caused by the hepatitis B virus (HBV)  HBV can spread from a mother to her baby during delivery   You will be checked for HBV as early as possible in the first trimester of each pregnancy  You need the test even if you received the hepatitis B vaccine or were tested before  You may need to have an HBV infection treated before you give birth  Urine tests  may also be done to check for sugar and protein  These can be signs of gestational diabetes or preeclampsia  Urine tests may also be done to check for signs of infection  A fetal ultrasound  shows pictures of your baby inside your uterus  The pictures are used to check your baby's development, movement, and position  Your healthcare provider may be able to tell you if you are having a boy or a girl during the ultrasound  This is usually possible at around 18 to 22 weeks  Genetic disorder screening tests  may be offered to you  These screening tests check your baby's risk for genetic disorders such as Down syndrome  A screening test includes a blood test and ultrasound  Blood tests may be used to check your DNA or your partner's DNA  Genetic tests are not always accurate or complete  Your baby may be born with a genetic disorder that did not show up in the tests  Talk to your healthcare provider about any concerns you have with genetic testing  Stay healthy during pregnancy:       Take prenatal vitamins as directed  Prenatal vitamins can help you get the amount of vitamins and minerals you need during pregnancy  Prenatal vitamins may also decrease the risk of certain birth defects  Eat a variety of healthy foods  Healthy foods include fruits, vegetables, whole-grain breads, low-fat dairy foods, beans, lean meats, and fish  Drink liquids as directed  Ask how much liquid to drink each day and which liquids are best for you  Limit caffeine to less than 200 milligrams each day  Limit your intake of fish to 2 servings each week  Choose fish low in mercury such as canned light tuna, shrimp, crab, salmon, cod, or tilapia   Do not  eat fish high in mercury such as swordfish, tilefish, jerry mackerel and shark  Drink liquids as directed  Ask how much liquid to drink each day and which liquids are best for you  Some healthy liquids include milk, water, and juice  It is not clear how caffeine affects pregnancy  Limit your intake of caffeine to less than 200 mg each day to avoid possible health problems  Caffeine may be found in coffee, tea, cola, sports drinks, and chocolate  Do not drink alcohol  Talk to your healthcare provider before you take medicines  Many medicines can harm your baby, especially during early pregnancy  Ask your healthcare provider before you take any medicines, including over-the-counter medicines, vitamins, herbs, or food supplements  Never use illegal or street drugs while you are pregnant  Talk to your healthcare provider if you are having trouble quitting street drugs  Exercise as directed  Ask your healthcare provider about the best exercise plan for you  Exercise may help you feel better and make your labor and delivery easier  Do not smoke  Smoking increases your risk of a miscarriage and other health problems during your pregnancy  Smoking can cause your baby to be born too early or weigh less at birth  Quit smoking as soon as you think you might be pregnant  Ask your healthcare provider for information if you need help quitting  Safety tips:   Do not use a hot tub or sauna  while you are pregnant, especially during your first trimester  Hot tubs and saunas may raise your baby's temperature and increase the risk of birth defects  It also increases your risk of miscarriage  Protect yourself from illness  Toxoplasmosis is a disease that can cause birth defects  To protect yourself from this disease, do not clean your cat's litter box yourself  Ask someone else to clean your cat's litter box while you are pregnant  Also, do not  eat raw meat or unwashed fruits and vegetables   Wash your hands after you touch raw meat, and eat only well-cooked meat  Wash fruits and vegetables well before you eat them  Follow up with your doctor or obstetrician as directed:  Go to all of your prenatal visits during your pregnancy  Write down your questions so you remember to ask them during your visits  © Copyright [employer] 2022 Information is for End User's use only and may not be sold, redistributed or otherwise used for commercial purposes  The above information is an  only  It is not intended as medical advice for individual conditions or treatments  Talk to your doctor, nurse or pharmacist before following any medical regimen to see if it is safe and effective for you

## 2023-04-27 ENCOUNTER — TELEPHONE (OUTPATIENT)
Dept: FAMILY MEDICINE CLINIC | Facility: CLINIC | Age: 28
End: 2023-04-27

## 2023-04-27 NOTE — TELEPHONE ENCOUNTER
Patent scheduled US in June that's the soonest they had  Is that okay? she said Dr Zenon Ordoñez to do it in the next 2 weeks?

## 2023-04-29 LAB
LAB AP GYN PRIMARY INTERPRETATION: NORMAL
Lab: NORMAL

## 2023-05-06 ENCOUNTER — APPOINTMENT (OUTPATIENT)
Dept: LAB | Facility: CLINIC | Age: 28
End: 2023-05-06

## 2023-05-06 DIAGNOSIS — Z11.3 ROUTINE SCREENING FOR STI (SEXUALLY TRANSMITTED INFECTION): ICD-10-CM

## 2023-05-06 DIAGNOSIS — Z3A.01 LESS THAN 8 WEEKS GESTATION OF PREGNANCY: ICD-10-CM

## 2023-05-06 LAB
ABO GROUP BLD: NORMAL
BASOPHILS # BLD AUTO: 0.06 THOUSANDS/ÂΜL (ref 0–0.1)
BASOPHILS NFR BLD AUTO: 1 % (ref 0–1)
BLD GP AB SCN SERPL QL: NEGATIVE
EOSINOPHIL # BLD AUTO: 0.09 THOUSAND/ÂΜL (ref 0–0.61)
EOSINOPHIL NFR BLD AUTO: 1 % (ref 0–6)
ERYTHROCYTE [DISTWIDTH] IN BLOOD BY AUTOMATED COUNT: 15.5 % (ref 11.6–15.1)
FERRITIN SERPL-MCNC: 61 NG/ML (ref 8–388)
HBV SURFACE AB SER-ACNC: >500 MIU/ML
HBV SURFACE AG SER QL: NORMAL
HCT VFR BLD AUTO: 35.7 % (ref 34.8–46.1)
HGB BLD-MCNC: 10.9 G/DL (ref 11.5–15.4)
HIV 1+2 AB+HIV1 P24 AG SERPL QL IA: NORMAL
HIV 2 AB SERPL QL IA: NORMAL
HIV1 AB SERPL QL IA: NORMAL
HIV1 P24 AG SERPL QL IA: NORMAL
IMM GRANULOCYTES # BLD AUTO: 0.03 THOUSAND/UL (ref 0–0.2)
IMM GRANULOCYTES NFR BLD AUTO: 0 % (ref 0–2)
LYMPHOCYTES # BLD AUTO: 1.32 THOUSANDS/ÂΜL (ref 0.6–4.47)
LYMPHOCYTES NFR BLD AUTO: 20 % (ref 14–44)
MCH RBC QN AUTO: 21.8 PG (ref 26.8–34.3)
MCHC RBC AUTO-ENTMCNC: 30.5 G/DL (ref 31.4–37.4)
MCV RBC AUTO: 71 FL (ref 82–98)
MONOCYTES # BLD AUTO: 0.42 THOUSAND/ÂΜL (ref 0.17–1.22)
MONOCYTES NFR BLD AUTO: 6 % (ref 4–12)
NEUTROPHILS # BLD AUTO: 4.77 THOUSANDS/ÂΜL (ref 1.85–7.62)
NEUTS SEG NFR BLD AUTO: 72 % (ref 43–75)
NRBC BLD AUTO-RTO: 0 /100 WBCS
PLATELET # BLD AUTO: 319 THOUSANDS/UL (ref 149–390)
PMV BLD AUTO: 10.2 FL (ref 8.9–12.7)
RBC # BLD AUTO: 5 MILLION/UL (ref 3.81–5.12)
RH BLD: POSITIVE
RUBV IGG SERPL IA-ACNC: 134.3 IU/ML
SPECIMEN EXPIRATION DATE: NORMAL
TREPONEMA PALLIDUM IGG+IGM AB [PRESENCE] IN SERUM OR PLASMA BY IMMUNOASSAY: NORMAL
WBC # BLD AUTO: 6.69 THOUSAND/UL (ref 4.31–10.16)

## 2023-05-08 LAB — BACTERIA UR CULT: ABNORMAL

## 2023-05-10 ENCOUNTER — HOSPITAL ENCOUNTER (EMERGENCY)
Facility: HOSPITAL | Age: 28
Discharge: STILL ADMITTED OR ADMINISTRATIVE | End: 2023-05-10
Admitting: OBSTETRICS & GYNECOLOGY

## 2023-05-10 ENCOUNTER — APPOINTMENT (EMERGENCY)
Dept: ULTRASOUND IMAGING | Facility: HOSPITAL | Age: 28
End: 2023-05-10

## 2023-05-10 ENCOUNTER — APPOINTMENT (OUTPATIENT)
Dept: RADIOLOGY | Facility: HOSPITAL | Age: 28
End: 2023-05-10

## 2023-05-10 ENCOUNTER — ANESTHESIA EVENT (EMERGENCY)
Dept: PERIOP | Facility: HOSPITAL | Age: 28
End: 2023-05-10

## 2023-05-10 ENCOUNTER — ANESTHESIA (EMERGENCY)
Dept: PERIOP | Facility: HOSPITAL | Age: 28
End: 2023-05-10

## 2023-05-10 ENCOUNTER — HOSPITAL ENCOUNTER (INPATIENT)
Facility: HOSPITAL | Age: 28
LOS: 3 days | Discharge: HOME | End: 2023-05-13
Attending: OBSTETRICS & GYNECOLOGY | Admitting: OBSTETRICS & GYNECOLOGY

## 2023-05-10 ENCOUNTER — HOSPITAL ENCOUNTER (EMERGENCY)
Facility: HOSPITAL | Age: 28
End: 2023-05-10
Attending: EMERGENCY MEDICINE

## 2023-05-10 VITALS
RESPIRATION RATE: 22 BRPM | HEART RATE: 125 BPM | DIASTOLIC BLOOD PRESSURE: 43 MMHG | SYSTOLIC BLOOD PRESSURE: 74 MMHG | OXYGEN SATURATION: 100 % | TEMPERATURE: 93.1 F

## 2023-05-10 DIAGNOSIS — O00.90 RUPTURED ECTOPIC PREGNANCY: Primary | ICD-10-CM

## 2023-05-10 DIAGNOSIS — R57.8 HEMORRHAGIC SHOCK (HCC): ICD-10-CM

## 2023-05-10 DIAGNOSIS — O00.109 TUBAL ECTOPIC PREGNANCY, UNSPECIFIED LATERALITY, UNSPECIFIED WHETHER INTRAUTERINE PREGNANCY PRESENT: Primary | ICD-10-CM

## 2023-05-10 PROBLEM — E87.20 METABOLIC ACIDOSIS: Status: ACTIVE | Noted: 2023-05-10

## 2023-05-10 LAB
ABO GROUP BLD BPU: NORMAL
ABO GROUP BLD: NORMAL
ALBUMIN SERPL BCP-MCNC: 2.6 G/DL (ref 3.5–5)
ALBUMIN SERPL BCP-MCNC: 3.2 G/DL (ref 3.5–5)
ALP SERPL-CCNC: 25 U/L (ref 34–104)
ALP SERPL-CCNC: 30 U/L (ref 34–104)
ALT SERPL W P-5'-P-CCNC: 11 U/L (ref 7–52)
ALT SERPL W P-5'-P-CCNC: 18 U/L (ref 7–52)
ANION GAP SERPL CALCULATED.3IONS-SCNC: 20 MMOL/L (ref 4–13)
ANION GAP SERPL CALCULATED.3IONS-SCNC: 7 MMOL/L (ref 4–13)
APTT PPP: 34 SECONDS (ref 23–37)
AST SERPL W P-5'-P-CCNC: 15 U/L (ref 13–39)
AST SERPL W P-5'-P-CCNC: 37 U/L (ref 13–39)
BASE EX.OXY STD BLDV CALC-SCNC: 78.6 % (ref 60–80)
BASE EXCESS BLDA CALC-SCNC: -10 MMOL/L (ref -2–3)
BASE EXCESS BLDA CALC-SCNC: -17 MMOL/L (ref -2–3)
BASE EXCESS BLDA CALC-SCNC: -19 MMOL/L (ref -2–3)
BASE EXCESS BLDV CALC-SCNC: -3.6 MMOL/L
BASOPHILS # BLD MANUAL: 0 THOUSAND/UL (ref 0–0.1)
BASOPHILS NFR MAR MANUAL: 0 % (ref 0–1)
BILIRUB SERPL-MCNC: 0.27 MG/DL (ref 0.2–1)
BILIRUB SERPL-MCNC: 1.38 MG/DL (ref 0.2–1)
BLD GP AB SCN SERPL QL: NEGATIVE
BPU ID: NORMAL
BUN SERPL-MCNC: 10 MG/DL (ref 5–25)
BUN SERPL-MCNC: 9 MG/DL (ref 5–25)
CA-I BLD-SCNC: 0.77 MMOL/L (ref 1.12–1.32)
CA-I BLD-SCNC: 0.85 MMOL/L (ref 1.12–1.32)
CA-I BLD-SCNC: 1.29 MMOL/L (ref 1.12–1.32)
CALCIUM ALBUM COR SERPL-MCNC: 7.5 MG/DL (ref 8.3–10.1)
CALCIUM ALBUM COR SERPL-MCNC: 8.5 MG/DL (ref 8.3–10.1)
CALCIUM SERPL-MCNC: 6.4 MG/DL (ref 8.4–10.2)
CALCIUM SERPL-MCNC: 7.9 MG/DL (ref 8.4–10.2)
CFFMA (FUNCTIONAL FIBRINOGEN MAX AMPLITUDE): 8.7 MM (ref 15–32)
CHLORIDE SERPL-SCNC: 107 MMOL/L (ref 96–108)
CHLORIDE SERPL-SCNC: 109 MMOL/L (ref 96–108)
CKLY30: 0 % (ref 0–2.6)
CKR(REACTION TIME): 4.5 MIN (ref 4.6–9.1)
CO2 SERPL-SCNC: 11 MMOL/L (ref 21–32)
CO2 SERPL-SCNC: 23 MMOL/L (ref 21–32)
CREAT SERPL-MCNC: 0.7 MG/DL (ref 0.6–1.3)
CREAT SERPL-MCNC: 0.95 MG/DL (ref 0.6–1.3)
CRTMA(RAPIDTEG MAX AMPLITUDE): 41.9 MM (ref 52–70)
EOSINOPHIL # BLD MANUAL: 0 THOUSAND/UL (ref 0–0.4)
EOSINOPHIL NFR BLD MANUAL: 0 % (ref 0–6)
ERYTHROCYTE [DISTWIDTH] IN BLOOD BY AUTOMATED COUNT: 16.8 % (ref 11.6–15.1)
ERYTHROCYTE [DISTWIDTH] IN BLOOD BY AUTOMATED COUNT: 18.9 % (ref 11.6–15.1)
FIBRINOGEN PPP-MCNC: 172 MG/DL (ref 227–495)
GFR SERPL CREATININE-BSD FRML MDRD: 117 ML/MIN/1.73SQ M
GFR SERPL CREATININE-BSD FRML MDRD: 81 ML/MIN/1.73SQ M
GLUCOSE SERPL-MCNC: 100 MG/DL (ref 65–140)
GLUCOSE SERPL-MCNC: 239 MG/DL (ref 65–140)
GLUCOSE SERPL-MCNC: 262 MG/DL (ref 65–140)
GLUCOSE SERPL-MCNC: 280 MG/DL (ref 65–140)
GLUCOSE SERPL-MCNC: 91 MG/DL (ref 65–140)
HCO3 BLDA-SCNC: 12.9 MMOL/L (ref 24–30)
HCO3 BLDA-SCNC: 13.4 MMOL/L (ref 22–28)
HCO3 BLDA-SCNC: 17 MMOL/L (ref 22–28)
HCO3 BLDV-SCNC: 22.7 MMOL/L (ref 24–30)
HCT VFR BLD AUTO: 44.1 % (ref 34.8–46.1)
HCT VFR BLD AUTO: 44.6 % (ref 34.8–46.1)
HCT VFR BLD CALC: 46 % (ref 34.8–46.1)
HCT VFR BLD CALC: 47 % (ref 34.8–46.1)
HCT VFR BLD CALC: 48 % (ref 34.8–46.1)
HGB BLD-MCNC: 13.5 G/DL (ref 11.5–15.4)
HGB BLD-MCNC: 15.2 G/DL (ref 11.5–15.4)
HGB BLDA-MCNC: 15.6 G/DL (ref 11.5–15.4)
HGB BLDA-MCNC: 16 G/DL (ref 11.5–15.4)
HGB BLDA-MCNC: 16.3 G/DL (ref 11.5–15.4)
INR PPP: 1.48 (ref 0.84–1.19)
LACTATE SERPL-SCNC: 1.5 MMOL/L (ref 0.5–2)
LYMPHOCYTES # BLD AUTO: 19 % (ref 14–44)
LYMPHOCYTES # BLD AUTO: 3.78 THOUSAND/UL (ref 0.6–4.47)
MAGNESIUM SERPL-MCNC: 1.5 MG/DL (ref 1.9–2.7)
MCH RBC QN AUTO: 27.8 PG (ref 26.8–34.3)
MCH RBC QN AUTO: 28 PG (ref 26.8–34.3)
MCHC RBC AUTO-ENTMCNC: 30.9 G/DL (ref 31.4–37.4)
MCHC RBC AUTO-ENTMCNC: 34.1 G/DL (ref 31.4–37.4)
MCV RBC AUTO: 82 FL (ref 82–98)
MCV RBC AUTO: 90 FL (ref 82–98)
MONOCYTES # BLD AUTO: 0.6 THOUSAND/UL (ref 0–1.22)
MONOCYTES NFR BLD: 3 % (ref 4–12)
NEUTROPHILS # BLD MANUAL: 15.52 THOUSAND/UL (ref 1.85–7.62)
NEUTS BAND NFR BLD MANUAL: 9 % (ref 0–8)
NEUTS SEG NFR BLD AUTO: 69 % (ref 43–75)
O2 CT BLDV-SCNC: 17.4 ML/DL
PCO2 BLD: 14 MMOL/L (ref 21–32)
PCO2 BLD: 15 MMOL/L (ref 21–32)
PCO2 BLD: 18 MMOL/L (ref 21–32)
PCO2 BLD: 40.4 MM HG (ref 36–44)
PCO2 BLD: 47.5 MM HG (ref 36–44)
PCO2 BLD: 52.8 MM HG (ref 42–50)
PCO2 BLDV: 45.1 MM HG (ref 42–50)
PH BLD: 7 [PH] (ref 7.3–7.4)
PH BLD: 7.06 [PH] (ref 7.35–7.45)
PH BLD: 7.23 [PH] (ref 7.35–7.45)
PH BLDV: 7.32 [PH] (ref 7.3–7.4)
PHOSPHATE SERPL-MCNC: 3.5 MG/DL (ref 2.7–4.5)
PLATELET # BLD AUTO: 101 THOUSANDS/UL (ref 149–390)
PLATELET # BLD AUTO: 108 THOUSANDS/UL (ref 149–390)
PLATELET # BLD AUTO: 109 THOUSANDS/UL (ref 149–390)
PLATELET BLD QL SMEAR: ABNORMAL
PMV BLD AUTO: 10.7 FL (ref 8.9–12.7)
PMV BLD AUTO: 9.2 FL (ref 8.9–12.7)
PMV BLD AUTO: 9.5 FL (ref 8.9–12.7)
PO2 BLD: 173 MM HG (ref 75–129)
PO2 BLD: 59 MM HG (ref 75–129)
PO2 BLD: 74 MM HG (ref 35–45)
PO2 BLDV: 42.7 MM HG (ref 35–45)
POTASSIUM BLD-SCNC: 4.9 MMOL/L (ref 3.5–5.3)
POTASSIUM BLD-SCNC: 5 MMOL/L (ref 3.5–5.3)
POTASSIUM BLD-SCNC: 5.1 MMOL/L (ref 3.5–5.3)
POTASSIUM SERPL-SCNC: 4.2 MMOL/L (ref 3.5–5.3)
POTASSIUM SERPL-SCNC: 5.2 MMOL/L (ref 3.5–5.3)
PROT SERPL-MCNC: 4.2 G/DL (ref 6.4–8.4)
PROT SERPL-MCNC: 5.1 G/DL (ref 6.4–8.4)
PROTHROMBIN TIME: 18.2 SECONDS (ref 11.6–14.5)
RBC # BLD AUTO: 4.86 MILLION/UL (ref 3.81–5.12)
RBC # BLD AUTO: 5.43 MILLION/UL (ref 3.81–5.12)
RBC MORPH BLD: NORMAL
RH BLD: POSITIVE
SAO2 % BLD FROM PO2: 84 % (ref 60–85)
SAO2 % BLD FROM PO2: 85 % (ref 60–85)
SAO2 % BLD FROM PO2: 99 % (ref 60–85)
SODIUM BLD-SCNC: 138 MMOL/L (ref 136–145)
SODIUM BLD-SCNC: 138 MMOL/L (ref 136–145)
SODIUM BLD-SCNC: 143 MMOL/L (ref 136–145)
SODIUM SERPL-SCNC: 138 MMOL/L (ref 135–147)
SODIUM SERPL-SCNC: 139 MMOL/L (ref 135–147)
SPECIMEN EXPIRATION DATE: NORMAL
SPECIMEN SOURCE: ABNORMAL
THROMBIN TIME: 17.8 SECONDS (ref 14.7–18.4)
UNIT DISPENSE STATUS: NORMAL
UNIT PRODUCT CODE: NORMAL
UNIT PRODUCT VOLUME: 280 ML
UNIT PRODUCT VOLUME: 280 ML
UNIT PRODUCT VOLUME: 350 ML
UNIT RH: NORMAL
WBC # BLD AUTO: 11.47 THOUSAND/UL (ref 4.31–10.16)
WBC # BLD AUTO: 19.9 THOUSAND/UL (ref 4.31–10.16)

## 2023-05-10 PROCEDURE — 0W9G0ZZ DRAINAGE OF PERITONEAL CAVITY, OPEN APPROACH: ICD-10-PCS | Performed by: OBSTETRICS & GYNECOLOGY

## 2023-05-10 PROCEDURE — 0UT60ZZ RESECTION OF LEFT FALLOPIAN TUBE, OPEN APPROACH: ICD-10-PCS | Performed by: OBSTETRICS & GYNECOLOGY

## 2023-05-10 RX ORDER — ROCURONIUM BROMIDE 10 MG/ML
INJECTION, SOLUTION INTRAVENOUS AS NEEDED
Status: DISCONTINUED | OUTPATIENT
Start: 2023-05-10 | End: 2023-05-10

## 2023-05-10 RX ORDER — PROPOFOL 10 MG/ML
INJECTION, EMULSION INTRAVENOUS AS NEEDED
Status: DISCONTINUED | OUTPATIENT
Start: 2023-05-10 | End: 2023-05-10

## 2023-05-10 RX ORDER — DEXAMETHASONE SODIUM PHOSPHATE 4 MG/ML
4 INJECTION, SOLUTION INTRA-ARTICULAR; INTRALESIONAL; INTRAMUSCULAR; INTRAVENOUS; SOFT TISSUE ONCE
Status: DISCONTINUED | OUTPATIENT
Start: 2023-05-10 | End: 2023-05-10 | Stop reason: HOSPADM

## 2023-05-10 RX ORDER — SODIUM CHLORIDE, SODIUM LACTATE, POTASSIUM CHLORIDE, CALCIUM CHLORIDE 600; 310; 30; 20 MG/100ML; MG/100ML; MG/100ML; MG/100ML
INJECTION, SOLUTION INTRAVENOUS CONTINUOUS PRN
Status: DISCONTINUED | OUTPATIENT
Start: 2023-05-10 | End: 2023-05-10

## 2023-05-10 RX ORDER — HYDROMORPHONE HCL 110MG/55ML
0.5 PATIENT CONTROLLED ANALGESIA SYRINGE INTRAVENOUS
Status: DISCONTINUED | OUTPATIENT
Start: 2023-05-10 | End: 2023-05-10

## 2023-05-10 RX ORDER — METOCLOPRAMIDE HYDROCHLORIDE 5 MG/ML
10 INJECTION INTRAMUSCULAR; INTRAVENOUS ONCE AS NEEDED
Status: DISCONTINUED | OUTPATIENT
Start: 2023-05-10 | End: 2023-05-10 | Stop reason: HOSPADM

## 2023-05-10 RX ORDER — FENTANYL CITRATE 50 UG/ML
50 INJECTION, SOLUTION INTRAMUSCULAR; INTRAVENOUS ONCE
Status: COMPLETED | OUTPATIENT
Start: 2023-05-10 | End: 2023-05-10

## 2023-05-10 RX ORDER — HYDROMORPHONE HCL 110MG/55ML
0.5 PATIENT CONTROLLED ANALGESIA SYRINGE INTRAVENOUS EVERY 2 HOUR PRN
Status: DISCONTINUED | OUTPATIENT
Start: 2023-05-10 | End: 2023-05-11

## 2023-05-10 RX ORDER — MIDAZOLAM HYDROCHLORIDE 2 MG/2ML
INJECTION, SOLUTION INTRAMUSCULAR; INTRAVENOUS AS NEEDED
Status: DISCONTINUED | OUTPATIENT
Start: 2023-05-10 | End: 2023-05-10

## 2023-05-10 RX ORDER — PHENYLEPHRINE HCL IN 0.9% NACL 1 MG/10 ML
SYRINGE (ML) INTRAVENOUS AS NEEDED
Status: DISCONTINUED | OUTPATIENT
Start: 2023-05-10 | End: 2023-05-10

## 2023-05-10 RX ORDER — CEFAZOLIN SODIUM 1 G/3ML
INJECTION, POWDER, FOR SOLUTION INTRAMUSCULAR; INTRAVENOUS AS NEEDED
Status: DISCONTINUED | OUTPATIENT
Start: 2023-05-10 | End: 2023-05-10

## 2023-05-10 RX ORDER — ONDANSETRON 2 MG/ML
4 INJECTION INTRAMUSCULAR; INTRAVENOUS ONCE AS NEEDED
Status: DISCONTINUED | OUTPATIENT
Start: 2023-05-10 | End: 2023-05-10 | Stop reason: HOSPADM

## 2023-05-10 RX ORDER — MAGNESIUM HYDROXIDE 1200 MG/15ML
LIQUID ORAL AS NEEDED
Status: DISCONTINUED | OUTPATIENT
Start: 2023-05-10 | End: 2023-05-10 | Stop reason: HOSPADM

## 2023-05-10 RX ORDER — HYDROMORPHONE HCL/PF 1 MG/ML
0.4 SYRINGE (ML) INJECTION
Status: DISCONTINUED | OUTPATIENT
Start: 2023-05-10 | End: 2023-05-10 | Stop reason: HOSPADM

## 2023-05-10 RX ORDER — SUCCINYLCHOLINE/SOD CL,ISO/PF 100 MG/5ML
SYRINGE (ML) INTRAVENOUS AS NEEDED
Status: DISCONTINUED | OUTPATIENT
Start: 2023-05-10 | End: 2023-05-10

## 2023-05-10 RX ORDER — CEFAZOLIN SODIUM 2 G/50ML
2000 SOLUTION INTRAVENOUS ONCE
Status: CANCELLED | OUTPATIENT
Start: 2023-05-10 | End: 2023-05-10

## 2023-05-10 RX ORDER — CHLORHEXIDINE GLUCONATE 0.12 MG/ML
15 RINSE ORAL EVERY 12 HOURS SCHEDULED
Status: DISCONTINUED | OUTPATIENT
Start: 2023-05-10 | End: 2023-05-11

## 2023-05-10 RX ORDER — SODIUM CHLORIDE 9 MG/ML
125 INJECTION, SOLUTION INTRAVENOUS CONTINUOUS
Status: CANCELLED | OUTPATIENT
Start: 2023-05-10

## 2023-05-10 RX ORDER — MAGNESIUM SULFATE HEPTAHYDRATE 40 MG/ML
2 INJECTION, SOLUTION INTRAVENOUS ONCE
Status: COMPLETED | OUTPATIENT
Start: 2023-05-10 | End: 2023-05-10

## 2023-05-10 RX ORDER — MEPERIDINE HYDROCHLORIDE 25 MG/ML
12.5 INJECTION INTRAMUSCULAR; INTRAVENOUS; SUBCUTANEOUS ONCE AS NEEDED
Status: DISCONTINUED | OUTPATIENT
Start: 2023-05-10 | End: 2023-05-10 | Stop reason: HOSPADM

## 2023-05-10 RX ORDER — SODIUM CHLORIDE, SODIUM GLUCONATE, SODIUM ACETATE, POTASSIUM CHLORIDE, MAGNESIUM CHLORIDE, SODIUM PHOSPHATE, DIBASIC, AND POTASSIUM PHOSPHATE .53; .5; .37; .037; .03; .012; .00082 G/100ML; G/100ML; G/100ML; G/100ML; G/100ML; G/100ML; G/100ML
75 INJECTION, SOLUTION INTRAVENOUS CONTINUOUS
Status: DISCONTINUED | OUTPATIENT
Start: 2023-05-10 | End: 2023-05-11

## 2023-05-10 RX ORDER — DIPHENHYDRAMINE HYDROCHLORIDE 50 MG/ML
12.5 INJECTION INTRAMUSCULAR; INTRAVENOUS ONCE AS NEEDED
Status: DISCONTINUED | OUTPATIENT
Start: 2023-05-10 | End: 2023-05-10 | Stop reason: HOSPADM

## 2023-05-10 RX ORDER — FENTANYL CITRATE 50 UG/ML
INJECTION, SOLUTION INTRAMUSCULAR; INTRAVENOUS AS NEEDED
Status: DISCONTINUED | OUTPATIENT
Start: 2023-05-10 | End: 2023-05-10

## 2023-05-10 RX ORDER — NOREPINEPHRINE BITARTRATE 1 MG/ML
INJECTION, SOLUTION INTRAVENOUS AS NEEDED
Status: DISCONTINUED | OUTPATIENT
Start: 2023-05-10 | End: 2023-05-10

## 2023-05-10 RX ORDER — FENTANYL CITRATE/PF 50 MCG/ML
50 SYRINGE (ML) INJECTION
Status: DISCONTINUED | OUTPATIENT
Start: 2023-05-10 | End: 2023-05-10 | Stop reason: HOSPADM

## 2023-05-10 RX ORDER — CALCIUM CHLORIDE 100 MG/ML
INJECTION INTRAVENOUS; INTRAVENTRICULAR AS NEEDED
Status: DISCONTINUED | OUTPATIENT
Start: 2023-05-10 | End: 2023-05-10

## 2023-05-10 RX ADMIN — Medication 200 MCG: at 11:19

## 2023-05-10 RX ADMIN — CEFAZOLIN 1000 MG: 1 INJECTION, POWDER, FOR SOLUTION INTRAMUSCULAR; INTRAVENOUS at 11:15

## 2023-05-10 RX ADMIN — Medication 100 MG: at 11:11

## 2023-05-10 RX ADMIN — GLYCERIN 2 DROP: .002; .002; .01 SOLUTION/ DROPS OPHTHALMIC at 19:53

## 2023-05-10 RX ADMIN — NOREPINEPHRINE BITARTRATE 16 MCG: 1 INJECTION INTRAVENOUS at 11:14

## 2023-05-10 RX ADMIN — CHLORHEXIDINE GLUCONATE 0.12% ORAL RINSE 15 ML: 1.2 LIQUID ORAL at 20:19

## 2023-05-10 RX ADMIN — NOREPINEPHRINE BITARTRATE 16 MCG: 1 INJECTION INTRAVENOUS at 11:11

## 2023-05-10 RX ADMIN — CALCIUM CHLORIDE 0.5 G: 100 INJECTION INTRAVENOUS; INTRAVENTRICULAR at 11:59

## 2023-05-10 RX ADMIN — SODIUM CHLORIDE, SODIUM LACTATE, POTASSIUM CHLORIDE, AND CALCIUM CHLORIDE 1000 ML: .6; .31; .03; .02 INJECTION, SOLUTION INTRAVENOUS at 10:11

## 2023-05-10 RX ADMIN — NOREPINEPHRINE BITARTRATE 16 MCG: 1 INJECTION INTRAVENOUS at 11:17

## 2023-05-10 RX ADMIN — HYDROMORPHONE HYDROCHLORIDE 0.5 MG: 2 INJECTION, SOLUTION INTRAMUSCULAR; INTRAVENOUS; SUBCUTANEOUS at 15:26

## 2023-05-10 RX ADMIN — SODIUM CHLORIDE, SODIUM LACTATE, POTASSIUM CHLORIDE, AND CALCIUM CHLORIDE 1000 ML: .6; .31; .03; .02 INJECTION, SOLUTION INTRAVENOUS at 10:12

## 2023-05-10 RX ADMIN — SUGAMMADEX 200 MG: 100 INJECTION, SOLUTION INTRAVENOUS at 12:12

## 2023-05-10 RX ADMIN — FENTANYL CITRATE 100 MCG: 50 INJECTION, SOLUTION INTRAMUSCULAR; INTRAVENOUS at 11:11

## 2023-05-10 RX ADMIN — FENTANYL CITRATE 50 MCG: 50 INJECTION, SOLUTION INTRAMUSCULAR; INTRAVENOUS at 10:25

## 2023-05-10 RX ADMIN — MIDAZOLAM HYDROCHLORIDE 2 MG: 1 INJECTION, SOLUTION INTRAMUSCULAR; INTRAVENOUS at 11:08

## 2023-05-10 RX ADMIN — HYDROMORPHONE HYDROCHLORIDE 0.5 MG: 2 INJECTION, SOLUTION INTRAMUSCULAR; INTRAVENOUS; SUBCUTANEOUS at 15:59

## 2023-05-10 RX ADMIN — ROCURONIUM BROMIDE 50 MG: 10 SOLUTION INTRAVENOUS at 11:15

## 2023-05-10 RX ADMIN — SODIUM BICARBONATE 50 MEQ: 84 INJECTION, SOLUTION INTRAVENOUS at 12:03

## 2023-05-10 RX ADMIN — CALCIUM CHLORIDE 0.5 G: 100 INJECTION INTRAVENOUS; INTRAVENTRICULAR at 11:57

## 2023-05-10 RX ADMIN — PHENYLEPHRINE HYDROCHLORIDE 50 MCG/MIN: 10 INJECTION, SOLUTION INTRAVENOUS at 11:17

## 2023-05-10 RX ADMIN — MAGNESIUM SULFATE HEPTAHYDRATE 2 G: 40 INJECTION, SOLUTION INTRAVENOUS at 19:50

## 2023-05-10 RX ADMIN — SODIUM CHLORIDE, SODIUM GLUCONATE, SODIUM ACETATE, POTASSIUM CHLORIDE, MAGNESIUM CHLORIDE, SODIUM PHOSPHATE, DIBASIC, AND POTASSIUM PHOSPHATE 75 ML/HR: .53; .5; .37; .037; .03; .012; .00082 INJECTION, SOLUTION INTRAVENOUS at 19:58

## 2023-05-10 RX ADMIN — Medication 200 MCG: at 11:17

## 2023-05-10 RX ADMIN — SODIUM BICARBONATE 50 MEQ: 84 INJECTION, SOLUTION INTRAVENOUS at 11:58

## 2023-05-10 RX ADMIN — SODIUM CHLORIDE, SODIUM LACTATE, POTASSIUM CHLORIDE, AND CALCIUM CHLORIDE: .6; .31; .03; .02 INJECTION, SOLUTION INTRAVENOUS at 11:05

## 2023-05-10 RX ADMIN — PROPOFOL 150 MG: 10 INJECTION, EMULSION INTRAVENOUS at 11:11

## 2023-05-10 RX ADMIN — FENTANYL CITRATE 50 MCG: 50 INJECTION INTRAMUSCULAR; INTRAVENOUS at 12:55

## 2023-05-10 NOTE — ANESTHESIA PREPROCEDURE EVALUATION
Procedure:  LAPAROSCOPY DIAGNOSTIC, possible exploratory laparotomy for ruptured ectopic (Abdomen)    Relevant Problems   No relevant active problems      Component Ref Range & Units 5/6/23 1028 10/8/14 0646 10/7/14 0636 10/6/14 1812 10/5/14 0524 10/4/14 0618 10/3/14 0604   WBC 4 31 - 10 16 Thousand/uL 6 69  10 94 High   12 98 High   13 51 High   13 17 High   16 27 High   17 67 High     RBC 3 81 - 5 12 Million/uL 5 00  4 36  4 31  4 61  4 31  4 67  5 05    Hemoglobin 11 5 - 15 4 g/dL 10 9 Low   9 0 Low   8 9 Low   9 4 Low   8 9 Low   9 8 Low   10 6 Low     Hematocrit 34 8 - 46 1 % 35 7  28 2 Low   27 7 Low   29 2 Low   27 2 Low   29 6 Low   31 8 Low     MCV 82 - 98 fL 71 Low   65 Low   64 Low   63 Low   63 Low   63 Low   63 Low     MCH 26 8 - 34 3 pg 21 8 Low   20 6 Low   20 6 Low   20 4 Low   20 6 Low   21 0 Low   21 0 Low     MCHC 31 4 - 37 4 g/dL 30 5 Low   31 9  32 1  32 2  32 7  33 1  33 3    RDW 11 6 - 15 1 % 15 5 High   15 3 High   15 1  15 0  15 3 High   15 8 High   15 6 High     MPV 8 9 - 12 7 fL 10 2  9 1  9 7  8 9  9 3  9 3  9 5    Platelets 227 - 300 Thousands/uL 319  690 High  R  672 High  R  698 High  R, CM  553 High  R  524 High  R, CM  511 High  R, CM        Physical Exam    Airway    Mallampati score: I  TM Distance: >3 FB  Neck ROM: full     Dental       Cardiovascular      Pulmonary      Other Findings        Anesthesia Plan  ASA Score- 2 Emergent    Anesthesia Type- general with ASA Monitors  Additional Monitors:   Airway Plan: ETT  Plan Factors-    Chart reviewed  Existing labs reviewed  Patient summary reviewed  Patient is not a current smoker  Patient did not smoke on day of surgery  Induction- intravenous  Postoperative Plan- Plan for postoperative opioid use  Planned trial extubation    Informed Consent- Anesthetic plan and risks discussed with patient  I personally reviewed this patient with the CRNA   Discussed and agreed on the Anesthesia Plan with the MARIA ISABEL Menard

## 2023-05-10 NOTE — EMTALA/ACUTE CARE TRANSFER
[facility] EMERGENCY DEPARTMENT  [address] [facility] [address]  Dept: [phone]      EMTALA TRANSFER CONSENT    NAME Luisa Simmons 1995                              MRN [id number]    I have been informed of my rights regarding examination, treatment, and transfer   by Dr Janette Emery MD    Benefits: Specialized equipment and/or services available at the receiving facility (Include comment)________________________ (Gynecology)    Risks: Potential deterioration of medical condition, Increased discomfort during transfer, Possible worsening of condition or death during transfer      Transfer Request   I acknowledge that my medical condition has been evaluated and explained to me by the emergency department physician or other qualified medical person and/or my attending physician who has recommended and offered to me further medical examination and treatment  I understand the Hospital's obligation with respect to the treatment and stabilization of my emergency medical condition  I nevertheless request to be transferred  I release the Hospital, the doctor, and any other persons caring for me from all responsibility or liability for any injury or ill effects that may result from my transfer and agree to accept all responsibility for the consequences of my choice to transfer, rather than receive stabilizing treatment at the Hospital  I understand that because the transfer is my request, my insurance may not provide reimbursement for the services  The Hospital will assist and direct me and my family in how to make arrangements for transfer, but the hospital is not liable for any fees charged by the transport service  In spite of this understanding, I refuse to consent to further medical examination and treatment which has been offered to me, and request transfer to  [address] [address], [address] : Jesse Jacobsen   I authorize the performance of emergency medical procedures and treatments upon me in both transit and upon arrival at the receiving facility  Additionally, I authorize the release of any and all medical records to the receiving facility and request they be transported with me, if possible  I authorize the performance of emergency medical procedures and treatments upon me in both transit and upon arrival at the receiving facility  Additionally, I authorize the release of any and all medical records to the receiving facility and request they be transported with me, if possible  I understand that the safest mode of transportation during a medical emergency is an ambulance and that the Hospital advocates the use of this mode of transport  Risks of traveling to the receiving facility by car, including absence of medical control, life sustaining equipment, such as oxygen, and medical personnel has been explained to me and I fully understand them  (KATEY CORRECT BOX BELOW)  [  ]  I consent to the stated transfer and to be transported by ambulance/helicopter  [  ]  I consent to the stated transfer, but refuse transportation by ambulance and accept full responsibility for my transportation by car  I understand the risks of non-ambulance transfers and I exonerate the Hospital and its staff from any deterioration in my condition that results from this refusal     X___________________________________________    DATE  05/10/23  TIME________  Signature of patient or legally responsible individual signing on patient behalf           RELATIONSHIP TO PATIENT_________________________          Provider Certification    NAME Mika Diaz                                         1995                              MRN [id number]    A medical screening exam was performed on the above named patient  Based on the examination:    Condition Necessitating Transfer The encounter diagnosis was Ruptured ectopic pregnancy  Patient Condition:  An emergency transfer is being made prior to stabilization due to the need for definitive care and the benefit of transfer outweighs the risk    Reason for Transfer: Level of Care needed not available at this facility    Transfer Requirements: [address]  [facility]   · Space available and qualified personnel available for treatment as acknowledged by    · Agreed to accept transfer and to provide appropriate medical treatment as acknowledged by       Dr Ariane Blanton  · Appropriate medical records of the examination and treatment of the patient are provided at the time of transfer   [address], [address] _______  · Transfer will be performed by qualified personnel from    and appropriate transfer equipment as required, including the use of necessary and appropriate life support measures  Provider Certification: I have examined the patient and explained the following risks and benefits of being transferred/refusing transfer to the patient/family:         Based on these reasonable risks and benefits to the patient and/or the unborn child(kati), and based upon the information available at the time of the patient’s examination, I certify that the medical benefits reasonably to be expected from the provision of appropriate medical treatments at another medical facility outweigh the increasing risks, if any, to the individual’s medical condition, and in the case of labor to the unborn child, from effecting the transfer      X____________________________________________ DATE 05/10/23        TIME_______      ORIGINAL - SEND TO MEDICAL RECORDS   COPY - SEND WITH PATIENT DURING TRANSFER

## 2023-05-10 NOTE — LETTER
[address] SURGICAL  [address] [facility] [address]  Dept: [phone]    May 13, 2023     Patient: Yumiko García   YOB: 1995   Date of Visit: 5/10/2023 - 5/13/23       To Whom it May Concern:    Yumiko García is under my professional care  She was admitted in the hospital from 5/10/2023 to 05/13/23 and underwent surgery  She should be excused from work from 5/10/23 through at least 6/11/23  If you have any questions or concerns, please don't hesitate to call           Sincerely,     Mary Anne Taylor MD

## 2023-05-10 NOTE — LETTER
[address]  SURGICAL  [address] [place] [id number]  Dept: [phone]    May 13, 2023     Patient: Aleena Dye   YOB: 1995   Date of Visit: 5/10/2023 - 5/13/23       To Whom it May Concern:    Aleena Dye is under my professional care  Her mother, Karel Estrada, accompanied her during her entire hospital stay from 5/10/23 - 5/13/23 and should be excused from work during this time  If you have any questions or concerns, please don't hesitate to call           Sincerely,     Soledad Duarte MD

## 2023-05-10 NOTE — ANESTHESIA PROCEDURE NOTES
"Introducer/Check-Espinoza  Performed by: Bryce Laureano MD  Authorized by: Bryce Laureano MD     Date/Time: 5/10/2023 11:12 AM  Consent: The procedure was performed in an emergent situation  Verbal consent not obtained  Written consent not obtained  Risks and benefits: risks, benefits and alternatives were discussed  Consent given by: patient  Patient understanding: patient states understanding of the procedure being performed  Patient consent: the patient's understanding of the procedure matches consent given  Procedure consent: procedure consent matches procedure scheduled  Relevant documents: relevant documents present and verified  Test results: test results available and properly labeled  Required items: required blood products, implants, devices, and special equipment available  Patient identity confirmed: anonymous protocol, patient vented/unresponsive  Time out: Immediately prior to procedure a \"time out\" was called to verify the correct patient, procedure, equipment, support staff and site/side marked as required    Location details: right internal jugular  Catheter size: 9 Fr  Anesthesia: see MAR for details    Sedation:  Patient sedated: no    Assessment: blood return through all ports and free fluid flow  Preparation: skin prepped with 2% chlorhexidine  Skin prep agent dried: skin prep agent completely dried prior to procedure  Sterile barriers: all five maximum sterile barriers used - cap, mask, sterile gown, sterile gloves, and large sterile sheet  Hand hygiene: hand hygiene performed prior to central venous catheter insertion  Ultrasound guidance: yes  Pre-procedure: landmarks identified  Number of attempts: 1  Successful placement: yes  Post-procedure: line sutured      "

## 2023-05-10 NOTE — ED NOTES
IV access is still in progress  Midline attempts have failed        Brandon Chrsitie RN  05/10/23 1038

## 2023-05-10 NOTE — ED NOTES
Provider at bedside       Nohemi Cai, [address] [facility]  05/10/23 7636

## 2023-05-10 NOTE — PROGRESS NOTES
Post Op Check  Mika Lot 33 y o  female MRN: [id number]  Unit/Bed#: ICU 08 Encounter: [id number]    POD#0 Post op Check    SUBJECTIVE:  The patient feels is tired and was having pain, but recently received pain medication  She notes some mild vaginal bleeding  Patient has questions about future pregnancy planning  OBJECTIVE:  /75   Pulse 95   Temp 98 3 °F (36 8 °C)   Resp (!) 24   SpO2 98%     Physical Exam  Physical Exam  Vitals reviewed  Constitutional:       General: She is not in acute distress  Appearance: She is well-developed  She is not diaphoretic  HENT:      Head: Normocephalic and atraumatic  Eyes:      Conjunctiva/sclera: Conjunctivae normal    Cardiovascular:      Rate and Rhythm: Normal rate  Pulmonary:      Effort: Pulmonary effort is normal  No respiratory distress  Abdominal:      General: There is no distension  Palpations: Abdomen is soft  Tenderness: There is abdominal tenderness  Comments: Pfannenstiel incision intact; mild oozing noted, no active bleeding; Steris strips in place  No evidence of separation    Genitourinary:     Comments: Normal appearing external genitalia  Minimal amount of vaginal bleeding  Skin:     General: Skin is warm and dry  Coloration: Skin is pale  Neurological:      General: No focal deficit present  Mental Status: She is alert and oriented to person, place, and time  Mental status is at baseline  Psychiatric:         Mood and Affect: Mood normal          Behavior: Behavior normal          Thought Content:  Thought content normal            EBL: 2L     I/O this shift:  In: 2016 2 [I V :2016 2]  Out: -     Lab Results   Component Value Date    WBC 11 47 (H) 05/10/2023    HGB 15 2 05/10/2023    HCT 44 6 05/10/2023    MCV 82 05/10/2023     (L) 05/10/2023       Lab Results   Component Value Date    GLUCOSE 91 05/10/2023    CALCIUM 7 9 (L) 05/10/2023     10/08/2014    K 4 2 05/10/2023    CO2 23 05/10/2023     (H) 05/10/2023    BUN 9 05/10/2023    CREATININE 0 70 05/10/2023       ASSESSMENT:  Bryn Pederson is a 33 y o   female s/p exploratory laparotomy, left salpingectomy for ruptured ectopic pregnancy complicated by hemorrhagic shock requiring massive transfusion  Recovering, stable  Briefly discussed operative findings and surgery performed  Discussed she still has future fertility with right tube and bilateral ovaries still in situ  Did discuss increased risk of future ectopic pregnancy with history of a prior ectopic pregnancy  PLAN:   - Primary care per ICU overnight  - If stable, consider transfer out of ICU tomorrow morning  - Pain: IV Dilaudid PRN, consider PCA if not controlled  - Briefly discussed future fertility and follow up care   Patient should follow up with OBGYN outpatient    - DVT PPx: SCDs, avoid pharmacologic anticoagulation for now   - OBGYN will continue to follow

## 2023-05-10 NOTE — ANESTHESIA PROCEDURE NOTES
"Arterial Line Insertion  Performed by: Tariq Martinez MD  Authorized by: Tariq Martinez MD   Consent: The procedure was performed in an emergent situation  Verbal consent not obtained  Written consent not obtained  Risks and benefits: risks, benefits and alternatives were discussed  Consent given by: patient  Patient understanding: patient states understanding of the procedure being performed  Patient consent: the patient's understanding of the procedure matches consent given  Procedure consent: procedure consent matches procedure scheduled  Relevant documents: relevant documents present and verified  Test results: test results available and properly labeled  Required items: required blood products, implants, devices, and special equipment available  Patient identity confirmed: anonymous protocol, patient vented/unresponsive  Time out: Immediately prior to procedure a \"time out\" was called to verify the correct patient, procedure, equipment, support staff and site/side marked as required  Preparation: Patient was prepped and draped in the usual sterile fashion    Indications: multiple ABGs and hemodynamic monitoring  Orientation:  Right  Location: radial artery  Sedation:  Patient sedated: no    Procedure Details:  Joe's test normal: yes  Needle gauge: 20  Seldinger technique: Seldinger technique used  Number of attempts: 1    Post-procedure:  Post-procedure: dressing applied  Waveform: good waveform and waveform confirmed  Post-procedure CNS: normal and unchanged        "

## 2023-05-10 NOTE — INTERIM OP NOTE
EXPLORATORY LAPAROTOMY FOR RUPTURE ECTOPIC PREGNANCY, LEFT SALPINGECTOMY, EVACUATION OF HEMOPERITONEUM  Postoperative Note  PATIENT NAME: Lisa Woods  : 1995  MRN: [id number]  AN OR ROOM 02    Surgery Date: 5/10/2023    Preop Diagnosis:  Ruptured ectopic pregnancy [O00 90]  Hemorrhagic shock ([address] ) [R57 8]    Post-Op Diagnosis Codes:     * Ruptured ectopic pregnancy [O00 90]     * Hemorrhagic shock ([address] ) [R57 8]    Procedure(s) (LRB):  EXPLORATORY LAPAROTOMY FOR RUPTURE ECTOPIC PREGNANCY, LEFT SALPINGECTOMY, EVACUATION OF HEMOPERITONEUM (N/A)    Surgeon(s) and Role:     * Daisy Zapata MD -Primary     * Garth Litten, MD - Assisting    Specimens:  ID Type Source Tests Collected by Time Destination   1 :  Tissue Fallopian Tube, Left TISSUE EXAM Garth Litten, MD 5/10/2023 1147        Surgical QBL:   No data recorded  EBL 2L  Anesthesia Type:   General     Findings:   Large hemoperitoneum    Ruptured Left ectopic pregnancy  Complications:   None      SIGNATURE: Daisy Zapata MD   DATE: May 10, 2023   TIME: 4:37 PM

## 2023-05-10 NOTE — INTERIM OP NOTE
EXPLORATORY LAPAROTOMY FOR RUPTURE ECTOPIC PREGNANCY, LEFT SALPINGECTOMY, EVACUATION OF PNEUMOPERITONEUM  Postoperative Note  PATIENT NAME: Manish Parisi  : 1995  MRN: [id number]  AN OR ROOM 02    Surgery Date: 5/10/2023    Preop Diagnosis:  Ruptured ectopic pregnancy [O00 90]  Hemorrhagic shock ([address] ) [R57 8]    Post-Op Diagnosis Codes:     * Ruptured ectopic pregnancy [O00 90]     * Hemorrhagic shock ([address] ) [R57 8]    Procedure(s) (LRB):  EXPLORATORY LAPAROTOMY FOR RUPTURE ECTOPIC PREGNANCY, LEFT SALPINGECTOMY, EVACUATION OF PNEUMOPERITONEUM (N/A)    Surgeon(s) and Role:     DAYLIN An, Primary     DAYLIN Rice  Assisting (no qualified residents available)    Specimens:  ID Type Source Tests Collected by Time Destination   1 :  Tissue Fallopian Tube, Left TISSUE EXAM Carlitos Mcnair MD 5/10/2023 1147        Surgical QBL:   No data recorded  EBL 2L  Anesthesia Type:   General     Findings:   Large amount of hemoperitoneum  Ruptured Left fallopian tube      Complications:   None      SIGNATURE: Nasreen Soria MD   DATE: May 10, 2023   TIME: 12:14 PM

## 2023-05-10 NOTE — ED NOTES
[facility]        Vernie Ganser, RN  05/10/23 7040

## 2023-05-10 NOTE — ASSESSMENT & PLAN NOTE
· Pt presented to [facility] with Abd pain, tachycardia, hypotension and positive FAST  · US showing Ruptured etopic pregnancy  She was transferred to [facility] for emergent OR  · POD 0 for Exlap, removal of large hemoperitoneum and cleared Ruptured ectopic pregnancy of Lt tube  · ESBL 2L   Pt received 6 PRBC and 2 FFP  · Admitted to SD1 post for close monitoring  · Stat labs including Teg now  · Dilaudid PRN for pain, consider PCA if pain hard to control  · NPO except for sips  · Trend WBC and fever curve

## 2023-05-10 NOTE — ANESTHESIA POSTPROCEDURE EVALUATION
Post-Op Assessment Note    CV Status:  Stable  Pain Score: 0    Pain management: adequate     Mental Status:  Confused and lethargic   Hydration Status:  Euvolemic   PONV Controlled:  Controlled   Airway Patency:  Patent      Post Op Vitals Reviewed: Yes      Staff: CRNA         No notable events documented      /66 (05/10/23 1256)    Temp 97 5 °F (36 4 °C) (05/10/23 1256)    Pulse (!) 120 (05/10/23 1256)   Resp 16 (05/10/23 1256)    SpO2 93 % (05/10/23 1256)

## 2023-05-10 NOTE — H&P
H&P Exam - Gynecology   Tram Amador 33 y o  female MRN: [id number]  Unit/Bed#: OR POOL Encounter: [id number]    Assessment/Plan     Assessment:  Suspected ruptured ectopic pregnancy, hemorrhagic shock  Plan:  Exploratory laparotomy with removal of ectopic pregnancy  History of Present Illness   HX and PE limited by: Language barrier and acuity of her presentation  HPI:  Mika Diaz is a 33 y o  female who presented to [facility] ER with complaint of abdominal pain and found to be hypotensive, tachycardic, with positive FAST exam  She was transferred to [place] for surgical management  She was seen late April as outpatient for yearly exam,  had positive pregnancy test that visit, scheduled for ultrasound  Upon arrival to [place] [facility] she was taken directly to OR for surgical evaluation  Historical Information   No past medical history on file  Past Surgical History:   Procedure Laterality Date   • ABCESS DRAINAGE      Appendical Abscess     OB/GYN History: Recent positive pregnancy test  Family History   Problem Relation Age of Onset   • Asthma Father      Social History   Social History     Substance and Sexual Activity   Alcohol Use No     Social History     Substance and Sexual Activity   Drug Use No     Social History     Tobacco Use   Smoking Status Never   Smokeless Tobacco Never     E-Cigarette/Vaping   • E-Cigarette Use Never User      E-Cigarette/Vaping Substances   • Nicotine No    • THC No    • CBD No    • Flavoring No    • Other No    • Unknown No        Meds/Allergies   all current active meds have been reviewed  No Known Allergies    Objective   Vitals: There were no vitals taken for this visit  No intake or output data in the 24 hours ending 05/10/23 1211    Invasive Devices:    Invasive Devices     Peripheral Intravenous Line  Duration           Peripheral IV 05/10/23 Right;Ventral (anterior) Wrist <1 day          Drain  Duration           Urethral Catheter Non-latex 16 Fr  <1 day Physical Exam  Constitutional:       Appearance: She is ill-appearing  Skin:     Coloration: Skin is pale  Lab Results: I have personally reviewed pertinent reports  Imaging: I have personally reviewed pertinent reports  EKG, Pathology, and Other Studies: I have personally reviewed pertinent reports        Code Status: No Order  Advance Directive and Living Will:      Power of :    POLST:

## 2023-05-10 NOTE — ED NOTES
US at [address] [place] [address] Street, [address] [place] [address]  05/10/23 1007

## 2023-05-10 NOTE — ED PROVIDER NOTES
History  Chief Complaint   Patient presents with   • Abdominal Pain     EMS reports abd pain and passing gas, onset this am around 50     25-year-old female, presenting by EMS with reported abdominal pain and lightheadedness started this morning  Patient in distress, unable to provide full history  Boyfriend is present, states patient was doing well yesterday, earlier today developed abdominal pain with nausea and lightheadedness  Denies any vaginal bleeding  Patient is reportedly 3 months pregnant, had recent prenatal visits, has not had ultrasound done yet  Boyfriend denies any known medical history  History provided by:  Patient and significant other   used: No        Prior to Admission Medications   Prescriptions Last Dose Informant Patient Reported? Taking? Multiple Vitamin (MULTI-VITAMINS PO)   Yes No   Sig: Take 1 tablet by mouth daily      Facility-Administered Medications: None       No past medical history on file  Past Surgical History:   Procedure Laterality Date   • ABCESS DRAINAGE      Appendical Abscess       Family History   Problem Relation Age of Onset   • Asthma Father      I have reviewed and agree with the history as documented  E-Cigarette/Vaping   • E-Cigarette Use Never User      E-Cigarette/Vaping Substances   • Nicotine No    • THC No    • CBD No    • Flavoring No    • Other No    • Unknown No      Social History     Tobacco Use   • Smoking status: Never   • Smokeless tobacco: Never   Vaping Use   • Vaping Use: Never used   Substance Use Topics   • Alcohol use: No   • Drug use: No       Review of Systems   Unable to perform ROS: Other (pt in distress)       Physical Exam  Physical Exam  Vitals and nursing note reviewed  Constitutional:       General: She is in acute distress  Appearance: She is ill-appearing  HENT:      Head: Normocephalic and atraumatic  Cardiovascular:      Rate and Rhythm: Regular rhythm  Tachycardia present  Pulmonary:      Comments: Tachypneic  Lungs clear  Abdominal:      Comments: Soft, nondistended  Suprapubic tenderness   Genitourinary:     Comments: No vaginal bleeding noted on external exam  Musculoskeletal:         General: Normal range of motion  Skin:     General: Skin is warm and dry  Neurological:      General: No focal deficit present  Mental Status: She is oriented to person, place, and time           Vital Signs  ED Triage Vitals   Temperature Pulse Respirations Blood Pressure SpO2   05/10/23 1030 05/10/23 0949 05/10/23 0949 05/10/23 0949 05/10/23 0949   (!) 90 1 °F (32 3 °C) (!) 119 20 (!) 87/40 96 %      Temp src Heart Rate Source Patient Position - Orthostatic VS BP Location FiO2 (%)   -- 05/10/23 0949 05/10/23 0949 05/10/23 0949 --    Monitor Lying Right arm       Pain Score       05/10/23 0949       10 - Worst Possible Pain           Vitals:    05/10/23 1015 05/10/23 1017 05/10/23 1027 05/10/23 1040   BP: (!) 84/55 (!) 84/55 (!) 80/40 (!) 74/43   Pulse: (!) 123 62 (!) 122 (!) 125   Patient Position - Orthostatic VS:  Lying Lying          Visual Acuity      ED Medications  Medications   lactated ringers bolus 1,000 mL (1,000 mL Intravenous New Bag 5/10/23 1011)   lactated ringers bolus 1,000 mL (1,000 mL Intravenous New Bag 5/10/23 1012)   fentanyl citrate (PF) 100 MCG/2ML 50 mcg (50 mcg Intravenous Given 5/10/23 1025)       Diagnostic Studies  Results Reviewed     Procedure Component Value Units Date/Time    CBC and differential [[id number]]     Lab Status: No result Specimen: Blood     Basic metabolic panel [[id number]]     Lab Status: No result Specimen: Blood     Magnesium [[id number]]     Lab Status: No result Specimen: Blood     Quantitative hCG [[id number]]     Lab Status: No result Specimen: Blood                  US OB < 14 weeks with transvaginal   Final Result by Marilynn Gage MD (05/10 1052)      Ruptured ectopic pregnancy in the left adnexa with associated large pelvic hematoma and moderate remote free fluid in the upper abdomen  Recommend expedited surgical consultation  Review of imaging from 2014 showed past history of drained abscess in the left hemipelvis which may have predisposed to scarring and development of ectopic pregnancy  Note: I personally discussed this study with Omar Brandt on 5/10/2023 10:44 AM       Workstation performed: [id number]                    Procedures  CriticalCare Time    Date/Time: 5/10/2023 10:42 AM  Performed by: Sharath Snyder MD  Authorized by: Sharath Snyder MD     Critical care provider statement:     Critical care time (minutes):  45    Critical care time was exclusive of:  Separately billable procedures and treating other patients    Critical care was necessary to treat or prevent imminent or life-threatening deterioration of the following conditions:  Shock    Critical care was time spent personally by me on the following activities:  Obtaining history from patient or surrogate, development of treatment plan with patient or surrogate, discussions with consultants, evaluation of patient's response to treatment, examination of patient, ordering and performing treatments and interventions, ordering and review of laboratory studies, ordering and review of radiographic studies, re-evaluation of patient's condition and review of old charts             ED Course  ED Course as of 05/10/23 1059   Wed May 10, 2023   1010 Spoke with Dr Dorene Vargas, on-call gynecologist at [facility] through [facility]  Concern for ruptured ectopic pregnancy, like patient transferred to [facility] ED right away  1011 Bedside abdominal ultrasound performed by myself, free fluid noted in right upper quadrant, cystic structure in suprapubic abdomen     1042 Patient receiving 2 units packed red blood cells emergently, transport here to take patient to [address] [place] [address]  30-year-old female, presented by EMS with abdominal pain, nausea, and weakness  Patient reports to be 3 months pregnant  Differential diagnosis includes ruptured ectopic pregnancy, dehydration, miscarriage among other diagnoses  Patient appears in distress, noted to be hypotensive and tachycardic on arrival   IV fluids started  Labs and ultrasound ordered  Bedside ultrasound performed by myself shows free fluid in right upper quadrant abdomen, concern for acute ruptured ectopic pregnancy  Given patient's hypotension and tachycardia, 2 units uncrossed matched red blood cell transfusion ordered emergently  Amount and/or Complexity of Data Reviewed  External Data Reviewed: labs  Labs: ordered  Decision-making details documented in ED Course  Radiology: ordered  Decision-making details documented in ED Course  Disposition  Final diagnoses:   Ruptured ectopic pregnancy     Time reflects when diagnosis was documented in both MDM as applicable and the Disposition within this note     Time User Action Codes Description Comment    5/10/2023 10:38 AM Carlos Gomez Add [O00 90] Ruptured ectopic pregnancy       ED Disposition     ED Disposition   Transfer to Another Facility-In Network    Condition   --    Date/Time   Wed May 10, 2023 10:38 AM    Comment   Yumiko García should be transferred out to [facility]             MD Documentation    Flowsheet Row Most Recent Value   Patient Condition An emergency transfer is being made prior to stabilization due to the need for definitive care and the benefit of transfer outweighs the risk   Reason for Transfer Level of Care needed not available at this facility   Benefits of Transfer Specialized equipment and/or services available at the receiving facility (Include comment)________________________  [Gynecology]   Risks of Transfer Potential deterioration of medical condition, Increased discomfort during transfer, Possible worsening of condition or death during transfer   Accepting Physician Dr Jacey Fonseca NameGianna      RN Documentation    [address] NameGianna      Follow-up Information    None         Patient's Medications   Discharge Prescriptions    No medications on file       No discharge procedures on file      PDMP Review     None          ED Provider  Electronically Signed by           Raphael Christianson MD  05/10/23 [address] Luke MD  05/10/23 1100

## 2023-05-10 NOTE — ANESTHESIA POSTPROCEDURE EVALUATION
Post-Op Assessment Note    CV Status:  Stable  Pain Score: 0       Mental Status:  Alert   PONV Controlled:  Controlled   Airway Patency:  Patent      Post Op Vitals Reviewed: Yes      Staff: Anesthesiologist         There were no known notable events for this encounter      BP      Temp      Pulse     Resp     SpO2   97 % on room air

## 2023-05-10 NOTE — ED NOTES
Multiple attempts for blood draw, have been unsuccessful  Marybel Tala has two patent IV's with blood return  IV fluids are infusing   Attempting midline access      Pamla Goldberg, RN  05/10/23 0527

## 2023-05-10 NOTE — CONSULTS
[facility]  Surgical Critical Care Consult  Name: Elida Torres 33 y o  female I MRN: [id number]  Unit/Bed#: ICU 08 I Date of Admission: 5/10/2023   Date of Service: 5/10/2023 I Hospital Day: 0    Consults    Assessment/Plan   Metabolic acidosis  Assessment & Plan  · Secondary to above and hemorrhagic shock  · Improving post operatively  · Continue supportive care  · VBG now  · Avoid hypotension     * Ectopic pregnancy  Assessment & Plan  · Pt presented to [facility] with Abd pain, tachycardia, hypotension and positive FAST  · US showing Ruptured etopic pregnancy  She was transferred to [facility] for emergent OR  · POD 0 for Exlap, removal of large hemoperitoneum and cleared Ruptured ectopic pregnancy of Lt tube  · ESBL 2L  Pt received 6 PRBC and 2 FFP  · Admitted to SD1 post for close monitoring  · Stat labs including Teg now  · Dilaudid PRN for pain, consider PCA if pain hard to control  · NPO except for sips  · Trend WBC and fever curve         -------------------------------------------------------------------------------------------------------------  Chief Complaint: Ruptured ectopic pregnancy    History of Present Illness     Tayla Felipe is a 33 y o  female who presented to [address] [place] [address] with abd pain, tachycardia, hypotension  US showing ruptured ectopic pregnancy  Pt was transferred to [facility] for emergent OR  Went to OR with OBGYN  5/10/23 Ex lap cleared large hemoperitoneum and Lt etopic pregnancy  Pt with ESBL 2L and received 6 units PRBC and 2 FFP intra Op and in PACU  Transfer to SD1 post opt for closer monitoring given academia and hemorraghic shock       History obtained from chart review and the patient   -------------------------------------------------------------------------------------------------------------  Dispo: Transfer to Stepdown Level 1     Code Status: Level 1 - Full Code  --------------------------------------------------------------------------------------------------------------  Review of Systems   All other systems reviewed and are negative  Denies N/V, SOB  Reports abd pain controlled with PRN pain meds  Physical Exam  Vitals and nursing note reviewed  Constitutional:       General: She is not in acute distress  Appearance: She is normal weight  HENT:      Head: Normocephalic and atraumatic  Nose: Nose normal       Mouth/Throat:      Mouth: Mucous membranes are moist       Pharynx: Oropharynx is clear  Eyes:      Extraocular Movements: Extraocular movements intact  Conjunctiva/sclera: Conjunctivae normal       Pupils: Pupils are equal, round, and reactive to light  Cardiovascular:      Rate and Rhythm: Normal rate and regular rhythm  Pulses: Normal pulses  Heart sounds: Normal heart sounds  Pulmonary:      Effort: Pulmonary effort is normal       Breath sounds: Normal breath sounds  Comments: RA, 98%  LS CTA B/L  Abdominal:      General: There is distension  Palpations: Abdomen is soft  Musculoskeletal:         General: Normal range of motion  Cervical back: Normal range of motion and neck supple  Right lower leg: No edema  Left lower leg: No edema  Skin:     General: Skin is warm and dry  Neurological:      General: No focal deficit present  Mental Status: She is alert and oriented to person, place, and time  Mental status is at baseline  Cranial Nerves: No cranial nerve deficit     Psychiatric:         Mood and Affect: Mood normal          --------------------------------------------------------------------------------------------------------------  Vitals:   Vitals:    05/10/23 1500 05/10/23 1515 05/10/23 9050 05/10/23 1556   BP: 110/74 115/80 105/73 113/75   Pulse: 84 90 91 95   Resp: 19 19 (!) 24    Temp:   100 3 °F (37 9 °C) 98 3 °F (36 8 °C)   TempSrc:       SpO2:  100% 98%      Temp Min: 90 1 °F (32 3 °C)  Max: 100 3 °F (37 9 °C)        There is no height or weight on file to calculate BMI      Laboratory and Diagnostics:  Results from last 7 days   Lab Units 05/10/23  1628 05/10/23  1254 05/10/23  1155 05/10/23  1151 05/10/23  1144 05/10/23  1126 05/06/23  1028   WBC Thousand/uL 11 47*  --   --   --   --  19 90* 6 69   HEMOGLOBIN g/dL 15 2  --   --   --   --  13 5 10 9*   I STAT HEMOGLOBIN g/dl  --  16 0*  --  15 6* 16 3*  --   --    HEMATOCRIT % 44 6  --   --   --   --  44 1 35 7   HEMATOCRIT, ISTAT %  --  47*  --  46 48*  --   --    PLATELETS Thousands/uL 101*  --  109*  --   --  108* 319   NEUTROS PCT %  --   --   --   --   --   --  72   BANDS PCT %  --   --   --   --   --  9*  --    MONOS PCT %  --   --   --   --   --   --  6   MONO PCT %  --   --   --   --   --  3*  --      Results from last 7 days   Lab Units 05/10/23  1628 05/10/23  1254 05/10/23  1151 05/10/23  1144 05/10/23  1126   SODIUM mmol/L 139  --   --   --  138   POTASSIUM mmol/L 4 2  --   --   --  5 2   CHLORIDE mmol/L 109*  --   --   --  107   CO2 mmol/L 23  --   --   --  11*   CO2, I-STAT mmol/L  --  18* 15* 14*  --    ANION GAP mmol/L 7  --   --   --  20*   BUN mg/dL 9  --   --   --  10   CREATININE mg/dL 0 70  --   --   --  0 95   CALCIUM mg/dL 7 9*  --   --   --  6 4*   GLUCOSE RANDOM mg/dL 100  --   --   --  280*   ALT U/L 18  --   --   --  11   AST U/L 37  --   --   --  15   ALK PHOS U/L 30*  --   --   --  25*   ALBUMIN g/dL 3 2*  --   --   --  2 6*   TOTAL BILIRUBIN mg/dL 1 38*  --   --   --  0 27     Results from last 7 days   Lab Units 05/10/23  1628   MAGNESIUM mg/dL 1 5*   PHOSPHORUS mg/dL 3 5      Results from last 7 days   Lab Units 05/10/23  1155   INR  1 48*   PTT seconds 34          Results from last 7 days   Lab Units 05/10/23  1628   LACTIC ACID mmol/L 1 5     ABG:    VBG:  Results from last 7 days   Lab Units 05/10/23  1628   PH JASON  7 319   PCO2 JASON mm Hg 45 1   PO2 JASON mm Hg 42 7   HCO3 JASON mmol/L 22 7* "  BASE EXC JASON mmol/L -3 6           Micro:  Results from last 7 days   Lab Units 05/06/23  1028   URINE CULTURE  10,000-19,000 cfu/ml Escherichia coli*       EKG: NSR, 95BPM  Imaging: I have personally reviewed pertinent reports  Historical Information   No past medical history on file  Past Surgical History:   Procedure Laterality Date   • ABCESS DRAINAGE      Appendical Abscess     Social History   Social History     Substance and Sexual Activity   Alcohol Use No     Social History     Substance and Sexual Activity   Drug Use No     Social History     Tobacco Use   Smoking Status Never   Smokeless Tobacco Never     Exercise History: ambulates  Family History:   Family History   Problem Relation Age of Onset   • Asthma Father            Medications:  Current Facility-Administered Medications   Medication Dose Route Frequency   • chlorhexidine (PERIDEX) 0 12 % oral rinse 15 mL  15 mL Mouth/Throat Q12H [facility]   • HYDROmorphone (DILAUDID) injection 0 5 mg  0 5 mg Intravenous Q5 Min PRN     Home medications:  Prior to Admission Medications   Prescriptions Last Dose Informant Patient Reported? Taking? Multiple Vitamin (MULTI-VITAMINS PO)   Yes No   Sig: Take 1 tablet by mouth daily      Facility-Administered Medications: None     Allergies:  No Known Allergies  ------------------------------------------------------------------------------------------------------------  Advance Directive and Living Will:      Power of :    POLST:    ------------------------------------------------------------------------------------------------------------  Care Time Delivered:   No Critical Care time spent       TYRONE Jackson        Portions of the record may have been created with voice recognition software  Occasional wrong word or \"sound a like\" substitutions may have occurred due to the inherent limitations of voice recognition software    Read the chart carefully and recognize, using context, where " substitutions have occurred

## 2023-05-10 NOTE — LETTER
[address] [place] [address]  Dept: [phone]    May 10, 2023     Patient: Wendi Olivas   YOB: 1995   Date of Visit: 5/10/2023       To Whom it May Concern:    Wendi Olivas is under my professional care  She was seen in the hospital from 5/10/2023  She is admitted to [facility] at this time and will not be into work at least up until 5/14/23  Further work restrictions, if needed, will be defined when she is discharged       If you have any questions or concerns, please don't hesitate to call           Sincerely,          TYRONE Caballero

## 2023-05-10 NOTE — OP NOTE
OPERATIVE REPORT  PATIENT NAME: Yoshi Lazar    :  1995  MRN: [id number]  Pt Location: AN OR ROOM 02    SURGERY DATE: 5/10/2023    Surgeon(s) and Role:     * Veldon Mcardle, MD - Primary     * Shaila Myers MD - Assisting    Preop Diagnosis:  Ruptured ectopic pregnancy [O00 90]  Hemorrhagic shock ([address] ) [R57 8]    Post-Op Diagnosis Codes:     * Ruptured ectopic pregnancy [O00 90]     * Hemorrhagic shock ([address] ) [R57 8]    Procedure(s) (LRB):  EXPLORATORY LAPAROTOMY FOR RUPTURE ECTOPIC PREGNANCY, LEFT SALPINGECTOMY, EVACUATION OF HEMOPERITONEUM (N/A)    Specimen(s):  ID Type Source Tests Collected by Time Destination   1 :  Tissue Fallopian Tube, Left TISSUE EXAM Shaila Myers MD 5/10/2023 1147        Surgical QBL:  No data recorded    Drains:  Urethral Catheter Non-latex 16 Fr  (Active)   Site Assessment Clean;Skin intact 05/10/23 141   Collection Container Standard drainage bag 05/10/23 1415   Securement Method Securing device (Describe) 05/10/23 1415   Number of days: 0       Anesthesia Type:   General    Operative Indications:  Ruptured ectopic pregnancy [O00 90]  Hemorrhagic shock ([address] ) [R57 8]        Operative Findings:  Large Hemoperitoneum  Ruptured Left Ectopic pregnancy  Normal Ovaries B/L  Normal Uterus    Complications:   None      The patient was taken to the operating room  General  anesthesia was adequately established  The patient was then placed in the dorsal supine position  Hernandez catheter was placed  The patient was then prepped with chloraprep abdominal prep and draped in the usual sterile fashion for a pfannensteil skin incision  A time out was performed to confirm correct patient and correct procedure  An incision was made in the skin with a surgical scalpel and sharp dissection was carried out over subsequent layers of tissue including the fascia  The fascia was incised at the midline and the fascial incision was extended bilaterally using blunt dissection  The rectus muscles were then divided at midline and the peritoneum was identified, tented up at its upper margin taking care to avoid the bladder, and then entered  The peritoneal was extended superiorly and inferiorly  Melvin  retractor was placed and large amount of hemoperitoneum was encountered, cleared and ruptured ectopic pregnancy noted on left tube  Uterus and left tube was elevated,  X-1 Enseal was used to remove left tube and area of rupture  The uterus was replaced into the abdomen and the pericolic gutters were cleared of all clots  The incision was once again reexamined and noted to be hemostatic Surgicel Fibrillar was placed over incision  The fascia was re approximated using 0-Vicryl in a running nonlocked fashion  The subcutaneous tissue was irrigated and cleared of all clots and debris  Good hemostasis was noted with Bovie electrocautery  The subcutaneous  tissue was reapproximated with 2-0 plain  The skin incision was closed using 4-0 Monocryl  Good hemostasis was noted  Patient tolerated the procedure well  All needle, sponge, and instrument counts were noted to be correct x 2 at the end of the procedure  Patient was transferred to the PACU in stable condition  I was present for the entire procedure      Patient Disposition:  PACU           SIGNATURE: Joseline Mccray MD  DATE: May 10, 2023  TIME: 4:42 PM

## 2023-05-10 NOTE — QUICK NOTE
Consult placed postoperatively following exploratory laparotomy for ruptured ectopic pregnancy, left salpingectomy and evacuation of pneumoperitoneum which occurred this afternoon  Patient will be n p o  and patient had no neuraxial or peripheral nerve blocks performed  As I am not currently on site at [facility], information was gleaned from the chart  Patient appears to have no history of chronic pain or any controlled substances prescribed per PDMP  Agree with critical care DUTCH that Dilaudid PCA is appropriate  Suggest the following settings: Continuous rate 0 mg/h, PCA dose 0 2 mg, lockout 10 minutes and 1 hour maximum 1 2 mg  Would avoid ordering any other opioid pain medications along with PCA  If patient's pain is uncontrolled overnight, can change the lockout to 6 minutes and 1 hour maximum to 2 mg  Patient will be seen first thing in the morning at which time a full consult will be performed      Aroldo Chand PA-C

## 2023-05-10 NOTE — ASSESSMENT & PLAN NOTE
· Secondary to above and hemorrhagic shock  · Improving post operatively  · Continue supportive care  · VBG now  · Avoid hypotension

## 2023-05-11 ENCOUNTER — APPOINTMENT (OUTPATIENT)
Dept: RADIOLOGY | Facility: HOSPITAL | Age: 28
End: 2023-05-11

## 2023-05-11 PROBLEM — R57.8 HEMORRHAGIC SHOCK (HCC): Status: ACTIVE | Noted: 2023-05-11

## 2023-05-11 LAB
ABO GROUP BLD BPU: NORMAL
ALBUMIN SERPL BCP-MCNC: 3 G/DL (ref 3.5–5)
ALP SERPL-CCNC: 33 U/L (ref 34–104)
ALT SERPL W P-5'-P-CCNC: 15 U/L (ref 7–52)
ANION GAP SERPL CALCULATED.3IONS-SCNC: 8 MMOL/L (ref 4–13)
AST SERPL W P-5'-P-CCNC: 33 U/L (ref 13–39)
BASOPHILS # BLD AUTO: 0.02 THOUSANDS/ÂΜL (ref 0–0.1)
BASOPHILS NFR BLD AUTO: 0 % (ref 0–1)
BILIRUB SERPL-MCNC: 1.37 MG/DL (ref 0.2–1)
BPU ID: NORMAL
BUN SERPL-MCNC: 12 MG/DL (ref 5–25)
CA-I BLD-SCNC: 1.05 MMOL/L (ref 1.12–1.32)
CALCIUM ALBUM COR SERPL-MCNC: 8.3 MG/DL (ref 8.3–10.1)
CALCIUM SERPL-MCNC: 7.5 MG/DL (ref 8.4–10.2)
CHLORIDE SERPL-SCNC: 106 MMOL/L (ref 96–108)
CO2 SERPL-SCNC: 23 MMOL/L (ref 21–32)
CREAT SERPL-MCNC: 0.67 MG/DL (ref 0.6–1.3)
CROSSMATCH: NORMAL
EOSINOPHIL # BLD AUTO: 0 THOUSAND/ÂΜL (ref 0–0.61)
EOSINOPHIL NFR BLD AUTO: 0 % (ref 0–6)
ERYTHROCYTE [DISTWIDTH] IN BLOOD BY AUTOMATED COUNT: 16.9 % (ref 11.6–15.1)
EST. AVERAGE GLUCOSE BLD GHB EST-MCNC: 111 MG/DL
GFR SERPL CREATININE-BSD FRML MDRD: 119 ML/MIN/1.73SQ M
GLUCOSE SERPL-MCNC: 85 MG/DL (ref 65–140)
HBA1C MFR BLD: 5.5 %
HCT VFR BLD AUTO: 39.7 % (ref 34.8–46.1)
HGB BLD-MCNC: 13.6 G/DL (ref 11.5–15.4)
IMM GRANULOCYTES # BLD AUTO: 0.03 THOUSAND/UL (ref 0–0.2)
IMM GRANULOCYTES NFR BLD AUTO: 0 % (ref 0–2)
INR PPP: 1.28 (ref 0.84–1.19)
LYMPHOCYTES # BLD AUTO: 1 THOUSANDS/ÂΜL (ref 0.6–4.47)
LYMPHOCYTES NFR BLD AUTO: 11 % (ref 14–44)
MAGNESIUM SERPL-MCNC: 2.1 MG/DL (ref 1.9–2.7)
MCH RBC QN AUTO: 27.9 PG (ref 26.8–34.3)
MCHC RBC AUTO-ENTMCNC: 34.3 G/DL (ref 31.4–37.4)
MCV RBC AUTO: 81 FL (ref 82–98)
MONOCYTES # BLD AUTO: 0.63 THOUSAND/ÂΜL (ref 0.17–1.22)
MONOCYTES NFR BLD AUTO: 7 % (ref 4–12)
NEUTROPHILS # BLD AUTO: 7.22 THOUSANDS/ÂΜL (ref 1.85–7.62)
NEUTS SEG NFR BLD AUTO: 82 % (ref 43–75)
NRBC BLD AUTO-RTO: 0 /100 WBCS
PHOSPHATE SERPL-MCNC: 4.1 MG/DL (ref 2.7–4.5)
PLATELET # BLD AUTO: 97 THOUSANDS/UL (ref 149–390)
PMV BLD AUTO: 10.8 FL (ref 8.9–12.7)
POTASSIUM SERPL-SCNC: 3.8 MMOL/L (ref 3.5–5.3)
PROT SERPL-MCNC: 4.9 G/DL (ref 6.4–8.4)
PROTHROMBIN TIME: 16.2 SECONDS (ref 11.6–14.5)
RBC # BLD AUTO: 4.88 MILLION/UL (ref 3.81–5.12)
SODIUM SERPL-SCNC: 137 MMOL/L (ref 135–147)
UNIT DISPENSE STATUS: NORMAL
UNIT PRODUCT CODE: NORMAL
UNIT PRODUCT VOLUME: 280 ML
UNIT PRODUCT VOLUME: 280 ML
UNIT PRODUCT VOLUME: 350 ML
UNIT RH: NORMAL
WBC # BLD AUTO: 8.9 THOUSAND/UL (ref 4.31–10.16)

## 2023-05-11 RX ORDER — DOCUSATE SODIUM 100 MG/1
100 CAPSULE, LIQUID FILLED ORAL DAILY
Status: DISCONTINUED | OUTPATIENT
Start: 2023-05-11 | End: 2023-05-13 | Stop reason: HOSPADM

## 2023-05-11 RX ORDER — IBUPROFEN 600 MG/1
600 TABLET ORAL EVERY 6 HOURS SCHEDULED
Status: DISCONTINUED | OUTPATIENT
Start: 2023-05-11 | End: 2023-05-13 | Stop reason: HOSPADM

## 2023-05-11 RX ORDER — HYDROMORPHONE HCL 110MG/55ML
0.3 PATIENT CONTROLLED ANALGESIA SYRINGE INTRAVENOUS EVERY 2 HOUR PRN
Status: DISCONTINUED | OUTPATIENT
Start: 2023-05-11 | End: 2023-05-11

## 2023-05-11 RX ORDER — OXYCODONE HYDROCHLORIDE 10 MG/1
10 TABLET ORAL EVERY 4 HOURS PRN
Status: DISCONTINUED | OUTPATIENT
Start: 2023-05-11 | End: 2023-05-13 | Stop reason: HOSPADM

## 2023-05-11 RX ORDER — ENOXAPARIN SODIUM 100 MG/ML
40 INJECTION SUBCUTANEOUS DAILY
Status: DISCONTINUED | OUTPATIENT
Start: 2023-05-11 | End: 2023-05-13 | Stop reason: HOSPADM

## 2023-05-11 RX ORDER — HYDROMORPHONE HCL 110MG/55ML
0.2 PATIENT CONTROLLED ANALGESIA SYRINGE INTRAVENOUS EVERY 4 HOURS PRN
Status: DISCONTINUED | OUTPATIENT
Start: 2023-05-11 | End: 2023-05-13 | Stop reason: HOSPADM

## 2023-05-11 RX ORDER — OXYCODONE HYDROCHLORIDE 5 MG/1
5 TABLET ORAL EVERY 4 HOURS PRN
Status: DISCONTINUED | OUTPATIENT
Start: 2023-05-11 | End: 2023-05-13 | Stop reason: HOSPADM

## 2023-05-11 RX ORDER — ACETAMINOPHEN 325 MG/1
975 TABLET ORAL EVERY 8 HOURS SCHEDULED
Status: DISCONTINUED | OUTPATIENT
Start: 2023-05-11 | End: 2023-05-13 | Stop reason: HOSPADM

## 2023-05-11 RX ORDER — ACETAMINOPHEN 325 MG/1
650 TABLET ORAL EVERY 6 HOURS PRN
Status: DISCONTINUED | OUTPATIENT
Start: 2023-05-11 | End: 2023-05-11

## 2023-05-11 RX ORDER — AMOXICILLIN 250 MG
1 CAPSULE ORAL
Status: DISCONTINUED | OUTPATIENT
Start: 2023-05-11 | End: 2023-05-11

## 2023-05-11 RX ORDER — ONDANSETRON 2 MG/ML
4 INJECTION INTRAMUSCULAR; INTRAVENOUS EVERY 4 HOURS PRN
Status: DISCONTINUED | OUTPATIENT
Start: 2023-05-11 | End: 2023-05-13 | Stop reason: HOSPADM

## 2023-05-11 RX ADMIN — IBUPROFEN 600 MG: 400 TABLET ORAL at 17:38

## 2023-05-11 RX ADMIN — SODIUM CHLORIDE, SODIUM GLUCONATE, SODIUM ACETATE, POTASSIUM CHLORIDE, MAGNESIUM CHLORIDE, SODIUM PHOSPHATE, DIBASIC, AND POTASSIUM PHOSPHATE 75 ML/HR: .53; .5; .37; .037; .03; .012; .00082 INJECTION, SOLUTION INTRAVENOUS at 05:22

## 2023-05-11 RX ADMIN — DOCUSATE SODIUM 100 MG: 100 CAPSULE, LIQUID FILLED ORAL at 11:45

## 2023-05-11 RX ADMIN — ENOXAPARIN SODIUM 40 MG: 40 INJECTION SUBCUTANEOUS at 11:45

## 2023-05-11 RX ADMIN — ACETAMINOPHEN 975 MG: 325 TABLET ORAL at 21:11

## 2023-05-11 RX ADMIN — OXYCODONE HYDROCHLORIDE 5 MG: 5 TABLET ORAL at 11:45

## 2023-05-11 RX ADMIN — ACETAMINOPHEN 975 MG: 325 TABLET ORAL at 17:39

## 2023-05-11 RX ADMIN — HYDROMORPHONE HYDROCHLORIDE 0.5 MG: 2 INJECTION, SOLUTION INTRAMUSCULAR; INTRAVENOUS; SUBCUTANEOUS at 04:56

## 2023-05-11 NOTE — ASSESSMENT & PLAN NOTE
· Pt presented to [facility] with Abd pain, tachycardia, hypotension and positive FAST  · US showing Ruptured etopic pregnancy  She was transferred to [facility] for emergent OR  · POD #1 for Exlap, removal of large hemoperitoneum and cleared Ruptured ectopic pregnancy of Lt tube     · ESBL 2L, Pt received 6 PRBC and 2 FFP, MAC central line placed in OR  · Admitted to SD1 post for close monitoring  · Hgb post-op - 15 2  · TEG - CKR, MA low  · PT/INR - 18 2/1 48, repeat in AM   · Analgesia:  · Dilaudid 0 5mg q2hr PRN for pain, consider PCA if pain hard to control  · NPO except for sips  · Trend WBC and fever curve  · Check CBC in AM  · Encourage IS  · Hold pharmacological DVT prophylaxis for now, SCD's for DVT prophylaxis

## 2023-05-11 NOTE — PROGRESS NOTES
[facility]  Progress Note  Name: Nusrat Ballesteros  MRN: [id number]  Unit/Bed#: ICU 08 I Date of Admission: 5/10/2023   Date of Service: 2023 I Hospital Day: 1    Assessment/Plan   * Ectopic pregnancy  Assessment & Plan  · Pt presented to [facility] with Abd pain, tachycardia, hypotension and positive FAST  · US showing Ruptured etopic pregnancy  She was transferred to [facility] for emergent OR  · POD #1 for Exlap, removal of large hemoperitoneum and cleared Ruptured ectopic pregnancy of Lt tube  · ESBL 2L, Pt received 6 PRBC and 2 FFP, MAC central line placed in OR  · Admitted to SD1 post for close monitoring  · Hgb post-op - 15 2  · TEG - CKR, MA low  · PT/INR - 18 2 48, repeat in AM   · Analgesia:  · Dilaudid 0 5mg q2hr PRN for pain, consider PCA if pain hard to control  · NPO except for sips  · Trend WBC and fever curve  · Check CBC in AM  · Encourage IS  · Hold pharmacological DVT prophylaxis for now, SCD's for DVT prophylaxis    Metabolic acidosis  Assessment & Plan  · Secondary to above and hemorrhagic shock  · Improved post operatively  · Post-op /45/42/22  · AG 7, Lactic 1 5  · Continue supportive care  · Avoid hypotension   · Monitor and replete electrolytes  · Check CMP in AM      ICU Core Measures     A: Assess, Prevent, and Manage Pain · Has pain been assessed? Yes  · Need for changes to pain regimen? No   B: Both SAT/SAT  · N/A   C: Choice of Sedation · RASS Goal: N/A patient not on sedation  · Need for changes to sedation or analgesia regimen? NA   D: Delirium · CAM-ICU: Negative   E: Early Mobility  · Plan for early mobility? Yes   F: Family Engagement · Plan for family engagement today? Yes       Review of Invasive Devices: Hernandez Plan: Hernandez to be removed  Order has been placed and kept in place post-op, plan to D/C today  Central access plan: MAC high flow cather in place, can be removed today    Leiza Plan: Discontinue arterial line    Prophylaxis:  VTE Contraindicated secondary to: hemoperitoneum, can likely start DVT prophylaxis today  Stress Ulcer  not ordered       Subjective     24hr events: S/p ex-lap, removal of large hemoperitoneum and left salpingectomy yesterday with EBL 2L  Post-op remained hemodynamically stable  Pain well controlled with current regimen  Review of Systems   Constitutional: Negative  Negative for chills, diaphoresis, fatigue and fever  HENT: Negative  Eyes: Negative  Respiratory: Negative  Negative for cough, chest tightness and shortness of breath  Cardiovascular: Negative  Negative for chest pain and palpitations  Gastrointestinal: Positive for abdominal pain  Negative for abdominal distention, constipation, diarrhea, nausea and vomiting  Endocrine: Negative  Genitourinary: Negative  Musculoskeletal: Negative  Skin: Negative  Allergic/Immunologic: Negative  Neurological: Negative  Negative for dizziness, weakness, light-headedness, numbness and headaches  Hematological: Negative  Psychiatric/Behavioral: Negative  Objective                            Vitals I/O      Most Recent Min/Max in 24hrs   Temp 98 °F (36 7 °C) Temp  Min: 90 1 °F (32 3 °C)  Max: 100 3 °F (37 9 °C)   Pulse 86 Pulse  Min: 62  Max: 125   Resp 18 Resp  Min: 16  Max: 40   /64 BP  Min: 74/43  Max: 151/81   O2 Sat 94 % SpO2  Min: 92 %  Max: 100 %      Intake/Output Summary (Last 24 hours) at 5/11/2023 0429  Last data filed at 5/11/2023 0400  Gross per 24 hour   Intake 2618 65 ml   Output 800 ml   Net 1818 65 ml         Diet NPO; Sips of clear liquids     Invasive Monitoring Physical exam    Physical Exam  Vitals and nursing note reviewed  Constitutional:       General: She is not in acute distress  Appearance: Normal appearance  She is normal weight  She is not ill-appearing  HENT:      Head: Normocephalic and atraumatic        Mouth/Throat:      Mouth: Mucous membranes are moist       Pharynx: Oropharynx is clear  Eyes:      Pupils: Pupils are equal, round, and reactive to light  Cardiovascular:      Rate and Rhythm: Normal rate and regular rhythm  Pulses: Normal pulses  Radial pulses are 2+ on the right side and 2+ on the left side  Dorsalis pedis pulses are 2+ on the right side and 2+ on the left side  Heart sounds: Normal heart sounds  Pulmonary:      Effort: Pulmonary effort is normal       Breath sounds: Normal breath sounds  Abdominal:      General: There is no distension  Palpations: Abdomen is soft  Tenderness: There is abdominal tenderness  There is guarding  Comments: Midline incision   Genitourinary:     Comments: Urethral catheter  Musculoskeletal:      Cervical back: Full passive range of motion without pain, normal range of motion and neck supple  Right lower leg: No edema  Left lower leg: No edema  Skin:     General: Skin is warm and dry  Capillary Refill: Capillary refill takes less than 2 seconds  Neurological:      General: No focal deficit present  Mental Status: She is alert and oriented to person, place, and time  GCS: GCS eye subscore is 4  GCS verbal subscore is 5  GCS motor subscore is 6  Sensory: Sensation is intact  Motor: Motor function is intact  Psychiatric:         Attention and Perception: Attention and perception normal          Mood and Affect: Affect normal  Mood is anxious  Behavior: Behavior is cooperative  Thought Content:  Thought content normal          Cognition and Memory: Cognition normal          Judgment: Judgment normal           Diagnostic Studies      EKG: NSR 80's  Imaging: No new imaging     Medications:  Scheduled PRN   chlorhexidine, 15 mL, Q12H [facility]      glycerin-hypromellose-, 2 drop, Q4H PRN  HYDROmorphone, 0 5 mg, Q2H PRN       Continuous    multi-electrolyte, 75 mL/hr, Last Rate: 75 mL/hr (05/10/23 1958)         Labs:    CBC    Recent Labs 05/10/23  1126 05/10/23  1144 05/10/23  1155 05/10/23  1254 05/10/23  1628   WBC 19 90*  --   --   --  11 47*   HGB 13 5   < >  --  16 0* 15 2   HCT 44 1   < >  --  47* 44 6   *  --  109*  --  101*   BANDSPCT 9*  --   --   --   --     < > = values in this interval not displayed  BMP    Recent Labs     05/10/23  1126 05/10/23  1144 05/10/23  1254 05/10/23  1628   SODIUM 138  --   --  139   K 5 2  --   --  4 2     --   --  109*   CO2 11*   < > 18* 23   AGAP 20*  --   --  7   BUN 10  --   --  9   CREATININE 0 95  --   --  0 70   CALCIUM 6 4*  --   --  7 9*    < > = values in this interval not displayed         Coags    Recent Labs     05/10/23  1155   INR 1 48*   PTT 34        Additional Electrolytes  Recent Labs     05/10/23  1151 05/10/23  1254 05/10/23  1628   MG  --   --  1 5*   PHOS  --   --  3 5   CAIONIZED 0 77* 1 29  --           Blood Gas    No recent results  Recent Labs     05/10/23  1628   PHVEN 7 319   NSO0INF 45 1   PO2VEN 42 7   FLJ1KAL 22 7*   BEVEN -3 6    LFTs  Recent Labs     05/10/23  1126 05/10/23  1628   ALT 11 18   AST 15 37   ALKPHOS 25* 30*   ALB 2 6* 3 2*   TBILI 0 27 1 38*       Infectious  No recent results  Glucose  Recent Labs     05/10/23  1126 05/10/23  1628   GLUC 280* [address] [facility], [address] [facility]

## 2023-05-11 NOTE — UTILIZATION REVIEW
Please note, all determinations MUST be either called into [phone] or faxed to [phone]:   [address] [place], [place] [address]s [facility], [address] [place] [address]  Tax ID: [id number]  NPI: [id number]   ATTENDING PROVIDER:  Attending Name and NPI#: Dano Omalley Md [[id number]]  Address: [address] [place] [address], [address] [place] [address]  Phone: [phone]     ADMISSION INFORMATION:  Place of Service: Inpatient [address] [facility]  [address]  Place of Service Code: 21  Inpatient Admission Date/Time: 5/10/23  2:14 PM  Discharge Date/Time: No discharge date for patient encounter  Admitting Diagnosis Code/Description:  Ruptured ectopic pregnancy [O00 90]  Hemorrhagic shock ([facility] [address] ) [R57 8]     UTILIZATION REVIEW CONTACT:  Jonathan Ren Utilization   Network Utilization Review Department  Phone: [phone]  Fax: [phone]  Email: [email or web address]  Contact for approvals/pending authorizations, clinical reviews, and discharge  PHYSICIAN ADVISORY SERVICES:  Medical Necessity Denial & [employer] Review  Phone: [phone]  Fax: [phone]  Email: [email or web address]

## 2023-05-11 NOTE — ASSESSMENT & PLAN NOTE
5/10: S/p ex lap, left salpingectomy, evacuation of hemoperitoneum   F/u pathology   Transferred back to OBGYN service 5/11  Routine incision care  Continue ambulation   S/p garcia; voiding   Continue regular diet   PO Pain regimen: Tylenol; Motrin; Amy PRN     Dispo: Discharge today

## 2023-05-11 NOTE — ASSESSMENT & PLAN NOTE
· Secondary to above and hemorrhagic shock  · Improved post operatively  · Post-op /45/42/22  · AG 7, Lactic 1 5  · Continue supportive care  · Avoid hypotension   · Monitor and replete electrolytes  · Check CMP in AM

## 2023-05-11 NOTE — PROGRESS NOTES
Gynecology Progress note   Aba May 33 y o  female MRN: [id number]  Unit/Bed#: ICU 08 Encounter: [id number]    Assessment: Aba May is a 33 y o    female s/p exploratory laparotomy, left salpingectomy, and evacuation of hemoperitoneum on 5/10 for ruptured ectopic pregnancy complicated by hemorrhagic shock requiring massive blood transfusion  Plan:  * Ectopic pregnancy  Assessment & Plan  5/10: S/p ex lap, left salpingectomy, evacuation of hemoperitoneum   F/u pathology   Appreciate post op ICU care   Routine incision care  Attempt OOB today   Patient has arterial and central access; ICU plans to d/c today   Consider advancing diet today   If can tolerate oral intake, consider adding PO pain regimen   Consider transfer out of ICU today or tomorrow if remains stable     Hemorrhagic shock ([facility] [address] )  Assessment & Plan  S/p massive transfusion 5/10: 6U pRBC, 2U FFP  Hgb currently stable   Vitals stable       DVT ppx: SCDs, consider pharmacologic DVT ppx today      Disposition: remain inpatient     Subjective:  Endorses some pain, received IV pain medication earlier   Denies nausea, vomiting   Not yet OOB   Taking small sips of water although she is NPO     Review of Systems   All other systems reviewed and are negative  Objective:   /59   Pulse 91   Temp 98 °F (36 7 °C) (Axillary)   Resp (!) 25   Wt 55 kg (121 lb 4 1 oz)   SpO2 92%   BMI 24 49 kg/m²     I/O last 3 completed shifts:   In: 2016 [I V :2016]  Out: 800 [Urine:800]  I/O this shift:  In: 602 5 [I V :602 5]  Out: 275 [Urine:275]    I/O        0701  05/10 0700 05/10 0701  05/11 0700    I V  (mL/kg)  2618 7 (47 6)    Total Intake(mL/kg)  2618 7 (47 6)    Urine (mL/kg/hr)  1075    Total Output  1075    Net  +1543 7                Lab Results   Component Value Date    WBC 8 90 2023    HGB 13 6 2023    HCT 39 7 2023    MCV 81 (L) 2023    PLT 97 (L) 2023       Lab Results   Component Value Date    GLUCOSE 91 05/10/2023    CALCIUM 7 9 (L) 05/10/2023     10/08/2014    K 4 2 05/10/2023    CO2 23 05/10/2023     (H) 05/10/2023    BUN 9 05/10/2023    CREATININE 0 70 05/10/2023       No results found for: POCGLU    Physical Exam  Vitals reviewed  Constitutional:       General: She is not in acute distress  Appearance: She is well-developed  HENT:      Head: Normocephalic and atraumatic  Eyes:      Conjunctiva/sclera: Conjunctivae normal    Cardiovascular:      Rate and Rhythm: Normal rate  Pulmonary:      Effort: Pulmonary effort is normal    Abdominal:      General: There is no distension  Palpations: Abdomen is soft  Tenderness: There is abdominal tenderness  Comments: Pfannenstiel incision c/d/i; covered in Steris strips  No active bleeding  No separation, drainage    Genitourinary:     Comments: Urinary garcia catheter in place  Musculoskeletal:         General: Normal range of motion  Skin:     General: Skin is warm and dry  Neurological:      General: No focal deficit present  Mental Status: She is alert and oriented to person, place, and time  Mental status is at baseline  Psychiatric:         Mood and Affect: Mood normal          Behavior: Behavior normal          Thought Content:  Thought content normal            Yuliana Richardson MD   PGY-4, GYN  5/11/2023 6:12 AM

## 2023-05-11 NOTE — QUICK NOTE
Patient seen and evaluated by Critical Care today and deemed to be appropriate for transfer to [facility]   Spoke to Dr Melissa Conklin from OB-GYN to accept patient transfer  Major changes to treatment plan from the day of transfer include garcia and lines removed  Diet started  Mobilize OOB  Repeat CXR today    Critical care can be contacted via [employer] with any questions or concerns

## 2023-05-12 LAB
ANION GAP SERPL CALCULATED.3IONS-SCNC: 5 MMOL/L (ref 4–13)
BUN SERPL-MCNC: 11 MG/DL (ref 5–25)
CALCIUM SERPL-MCNC: 7.7 MG/DL (ref 8.4–10.2)
CHLORIDE SERPL-SCNC: 108 MMOL/L (ref 96–108)
CO2 SERPL-SCNC: 24 MMOL/L (ref 21–32)
CREAT SERPL-MCNC: 0.66 MG/DL (ref 0.6–1.3)
ERYTHROCYTE [DISTWIDTH] IN BLOOD BY AUTOMATED COUNT: 17.2 % (ref 11.6–15.1)
GFR SERPL CREATININE-BSD FRML MDRD: 119 ML/MIN/1.73SQ M
GLUCOSE SERPL-MCNC: 85 MG/DL (ref 65–140)
HCT VFR BLD AUTO: 35.2 % (ref 34.8–46.1)
HGB BLD-MCNC: 11.7 G/DL (ref 11.5–15.4)
MCH RBC QN AUTO: 27.4 PG (ref 26.8–34.3)
MCHC RBC AUTO-ENTMCNC: 33.2 G/DL (ref 31.4–37.4)
MCV RBC AUTO: 82 FL (ref 82–98)
PLATELET # BLD AUTO: 108 THOUSANDS/UL (ref 149–390)
PMV BLD AUTO: 8.7 FL (ref 8.9–12.7)
POTASSIUM SERPL-SCNC: 3.6 MMOL/L (ref 3.5–5.3)
RBC # BLD AUTO: 4.27 MILLION/UL (ref 3.81–5.12)
SODIUM SERPL-SCNC: 137 MMOL/L (ref 135–147)
WBC # BLD AUTO: 9.32 THOUSAND/UL (ref 4.31–10.16)

## 2023-05-12 RX ADMIN — DOCUSATE SODIUM 100 MG: 100 CAPSULE, LIQUID FILLED ORAL at 08:23

## 2023-05-12 RX ADMIN — IBUPROFEN 600 MG: 400 TABLET ORAL at 12:02

## 2023-05-12 RX ADMIN — ACETAMINOPHEN 975 MG: 325 TABLET ORAL at 21:11

## 2023-05-12 RX ADMIN — IBUPROFEN 600 MG: 400 TABLET ORAL at 23:57

## 2023-05-12 RX ADMIN — OXYCODONE HYDROCHLORIDE 10 MG: 10 TABLET ORAL at 12:02

## 2023-05-12 RX ADMIN — IBUPROFEN 600 MG: 400 TABLET ORAL at 17:54

## 2023-05-12 NOTE — PROGRESS NOTES
Gynecology Progress note   Glenis Frank 33 y o  female MRN: [id number]  Unit/Bed#: ICU 08 Encounter: [id number]    Assessment: Glenis Frank is a 33 y o    female s/p exploratory laparotomy, left salpingectomy, and evacuation of hemoperitoneum on 5/10 for ruptured ectopic pregnancy complicated by hemorrhagic shock requiring massive blood transfusion  POD 2 today  Plan:  * Ectopic pregnancy  Assessment & Plan  5/10: S/p ex lap, left salpingectomy, evacuation of hemoperitoneum   F/u pathology   Transferred back to OBGYN service   Routine incision care  Continue ambulation   S/p garcia; voiding   Continue regular diet   PO Pain regimen: Tylenol; Motrin; Amy PRN   Consider d/c tomorrow     Hemorrhagic shock ([address] )  Assessment & Plan  S/p massive transfusion 5/10: 6U pRBC, 2U FFP  Hgb currently stable   Vitals stable       DVT ppx: SCDs, consider pharmacologic DVT ppx today      Disposition: remain inpatient     Subjective:  Patient states oral pain meds are not helping much  She is ambulating, tolerating PO, voiding, passing flatus  Has mild vaginal bleeding  Review of Systems   All other systems reviewed and are negative  Objective:   /70 (BP Location: Right arm)   Pulse 74   Temp 98 °F (36 7 °C) (Oral)   Resp 18   Wt 57 5 kg (126 lb 12 2 oz)   SpO2 98%   BMI 25 60 kg/m²     I/O last 3 completed shifts: In: 1052 5 [I V :1052 5]  Out: 825 [Urine:825]  No intake/output data recorded      I/O       05/10 07 07 07 07 07 0700    I V  (mL/kg) 2618 7 (47 6) 450 (7 8)     Total Intake(mL/kg) 2618 7 (47 6) 450 (7 8)     Urine (mL/kg/hr) 1225 400 (0 3)     Total Output 1225 400     Net +1393 7 +50                  Lab Results   Component Value Date    WBC 9 32 2023    HGB 11 7 2023    HCT 35 2 2023    MCV 82 2023     (L) 2023       Lab Results   Component Value Date    GLUCOSE 91 05/10/2023    CALCIUM 7 7 (L) 2023  10/08/2014    K 3 6 05/12/2023    CO2 24 05/12/2023     05/12/2023    BUN 11 05/12/2023    CREATININE 0 66 05/12/2023       No results found for: POCGLU    Physical Exam  Vitals reviewed  Constitutional:       General: She is not in acute distress  Appearance: She is well-developed  HENT:      Head: Normocephalic and atraumatic  Eyes:      Conjunctiva/sclera: Conjunctivae normal    Cardiovascular:      Rate and Rhythm: Normal rate  Pulmonary:      Effort: Pulmonary effort is normal    Abdominal:      General: There is no distension  Palpations: Abdomen is soft  Tenderness: There is abdominal tenderness (mild)  Comments: Pfannenstiel incision c/d/i; covered in Steris strips  No active bleeding  No separation, drainage    Genitourinary:     Comments: Minimal vaginal bleeding on pad   Musculoskeletal:         General: Normal range of motion  Skin:     General: Skin is warm and dry  Neurological:      General: No focal deficit present  Mental Status: She is alert and oriented to person, place, and time  Mental status is at baseline  Psychiatric:         Mood and Affect: Mood normal          Behavior: Behavior normal          Thought Content:  Thought content normal            Jaky Yu MD   PGY-4, GYN  5/12/2023 7:34 AM

## 2023-05-13 VITALS
SYSTOLIC BLOOD PRESSURE: 106 MMHG | WEIGHT: 118.2 LBS | DIASTOLIC BLOOD PRESSURE: 68 MMHG | TEMPERATURE: 98.6 F | BODY MASS INDEX: 23.87 KG/M2 | RESPIRATION RATE: 18 BRPM | OXYGEN SATURATION: 98 % | HEART RATE: 70 BPM

## 2023-05-13 RX ORDER — ACETAMINOPHEN 325 MG/1
975 TABLET ORAL EVERY 6 HOURS PRN
Qty: 45 TABLET | Refills: 0 | Status: SHIPPED | OUTPATIENT
Start: 2023-05-13

## 2023-05-13 RX ORDER — OXYCODONE HYDROCHLORIDE 5 MG/1
5 TABLET ORAL EVERY 4 HOURS PRN
Qty: 5 TABLET | Refills: 0 | Status: SHIPPED | OUTPATIENT
Start: 2023-05-13 | End: 2023-05-23

## 2023-05-13 RX ORDER — IBUPROFEN 600 MG/1
600 TABLET ORAL EVERY 6 HOURS SCHEDULED
Qty: 45 TABLET | Refills: 0 | Status: SHIPPED | OUTPATIENT
Start: 2023-05-13

## 2023-05-13 RX ADMIN — ACETAMINOPHEN 975 MG: 325 TABLET ORAL at 06:10

## 2023-05-13 RX ADMIN — IBUPROFEN 600 MG: 400 TABLET ORAL at 06:10

## 2023-05-13 NOTE — PROGRESS NOTES
Progress Note - OB/GYN   Elida Torres 33 y o  female MRN: [id number]  Unit/Bed#: W -01 Encounter: [id number]    Assessment:   33 y o  Elida Torres is a 33 y o   female POD#3 s/p exploratory laparotomy, left salpingectomy, and evacuation of hemoperitoneum on 5/10/23 for ruptured ectopic pregnancy complicated by hemorrhagic shock requiring massive blood transfusion  Patient stable and ready for discharge  Plan:   Hemorrhagic shock ([address] )  Assessment & Plan  S/p massive transfusion 5/10: 6U pRBC, 2U FFP  Hgb currently stable at 11 7  Vitals stable     * Ectopic pregnancy  Assessment & Plan  5/10: S/p ex lap, left salpingectomy, evacuation of hemoperitoneum   F/u pathology   Transferred back to OBGYN service   Routine incision care  Continue ambulation   S/p agrcia; voiding   Continue regular diet   PO Pain regimen: Tylenol; Motrin; Amy PRN     Dispo: Discharge today        Subjective:  Tram reports that she is feeling well  She is ambulating without any issue and denies dizziness  Her worst pain is referred pain to the right shoulder, however is well-controlled with medication  She is tolerating a regular diet and voiding  Meds:  No current facility-administered medications on file prior to encounter  Current Outpatient Medications on File Prior to Encounter   Medication Sig Dispense Refill   • Multiple Vitamin (MULTI-VITAMINS PO) Take 1 tablet by mouth daily           Physical Exam:  /68   Pulse 70   Temp 98 6 °F (37 °C)   Resp 18   Wt 53 6 kg (118 lb 3 2 oz)   SpO2 98%   BMI 23 87 kg/m²     Physical Exam  Constitutional:       General: She is not in acute distress  Appearance: She is not ill-appearing or toxic-appearing  HENT:      Head: Normocephalic and atraumatic  Pulmonary:      Effort: Pulmonary effort is normal  No respiratory distress  Breath sounds: No stridor  Abdominal:      Palpations: Abdomen is soft  Tenderness: There is no abdominal tenderness   There is no guarding or rebound  Comments: Incision dressings changed, incision is C/D/I and healing well   Skin:     General: Skin is warm and dry  Neurological:      General: No focal deficit present  Mental Status: She is alert and oriented to person, place, and time  Mental status is at baseline  Psychiatric:         Mood and Affect: Mood normal          Thought Content:  Thought content normal          Judgment: Judgment normal

## 2023-05-15 NOTE — UTILIZATION REVIEW
NOTIFICATION OF ADMISSION DISCHARGE   This is a Notification of Discharge from [facility]  Please be advised that this patient has been discharge from our facility  Below you will find the admission and discharge date and time including the patient’s disposition  UTILIZATION REVIEW CONTACT:  Colleen Silva MA  Utilization   Network Utilization Review Department  Phone: [phone] x carefully listen to the prompts  All voicemails are confidential   Email: [email or web address]     ADMISSION INFORMATION  PRESENTATION DATE: 5/10/2023 12:07 PM  OBERVATION ADMISSION DATE:   INPATIENT ADMISSION DATE: 5/10/23  2:14 PM   DISCHARGE DATE: 5/13/2023 12:03 PM   DISPOSITION:Home/Self Care    IMPORTANT INFORMATION:  Send all requests for admission clinical reviews, approved or denied determinations and any other requests to dedicated fax number below belonging to the campus where the patient is receiving treatment   List of dedicated fax numbers:  [address] DENIALS (Administrative/Medical Necessity) [phone]   [address] (Maternity/NICU/Pediatrics) [phone]   [facility] [phone]   [facility] [address] [phone]   [address] [phone]   220 [facility] [phone]   90 [facility] [phone]   [address] [place] [address] 119 [phone]   [facility]  [phone] [address] [facility] [phone]   [address] [facility] [address] [facility] [phone]

## 2023-05-16 ENCOUNTER — TRANSITIONAL CARE MANAGEMENT (OUTPATIENT)
Dept: FAMILY MEDICINE CLINIC | Facility: CLINIC | Age: 28
End: 2023-05-16

## 2023-05-22 ENCOUNTER — OFFICE VISIT (OUTPATIENT)
Dept: OBGYN CLINIC | Facility: CLINIC | Age: 28
End: 2023-05-22

## 2023-05-22 ENCOUNTER — APPOINTMENT (OUTPATIENT)
Dept: LAB | Facility: CLINIC | Age: 28
End: 2023-05-22

## 2023-05-22 VITALS
HEART RATE: 85 BPM | BODY MASS INDEX: 21.77 KG/M2 | HEIGHT: 59 IN | SYSTOLIC BLOOD PRESSURE: 109 MMHG | WEIGHT: 108 LBS | DIASTOLIC BLOOD PRESSURE: 75 MMHG | TEMPERATURE: 98.2 F

## 2023-05-22 DIAGNOSIS — O00.102 LEFT TUBAL PREGNANCY WITHOUT INTRAUTERINE PREGNANCY: ICD-10-CM

## 2023-05-22 DIAGNOSIS — R57.8 HEMORRHAGIC SHOCK (HCC): ICD-10-CM

## 2023-05-22 DIAGNOSIS — Z30.09 BIRTH CONTROL COUNSELING: Primary | ICD-10-CM

## 2023-05-22 DIAGNOSIS — Z90.79 HX OF UNILATERAL SALPINGECTOMY: ICD-10-CM

## 2023-05-22 LAB — B-HCG SERPL-ACNC: 134 MIU/ML (ref 0–5)

## 2023-05-22 NOTE — LETTER
May 22, 2023     Patient: Ace Covarrubias  YOB: 1995  Date of Visit: 5/22/2023      To Whom it May Concern:    Ace Covarrubias is under my professional care  Tram was seen in my office on 5/22/2023  [place] [employer] may return to work on June 7 without restrictions       If you have any questions or concerns, please don't hesitate to call           Sincerely,          Jorge Simmons MD        CC: No Recipients

## 2023-05-22 NOTE — PROGRESS NOTES
Postoperative evaluation  Rere Akers 33 y o  female MRN: [id number]  Unit/Bed#:  Encounter: [id number]    Azul Ignacio is a 33 y o  y o  female [facility] 50 who presents for a postoperative visit  She is 2 week  following a Exploratory laparotomy, left salpingectomy, evacuation of pneumoperitoneum on 23    Sherlyn Candelaria is a 32 yo  who initially presented in hemorrhagic shock from a ruptured ectopic pregnancy  She underwent the above surgery and was transferred to the ICU post-operatively  She received a total of 6 units of PRBCS and 2 of FFP  She was transferred out of the ICU and continued to do well  Her hemoglobin remained stable around 11 7  Anesthesia: general Bleeding no bleeding  Bowel function is normal  Bladder function is normal  Patient is not sexually active  Contraception method is none  The following portions of the patient's history were reviewed and updated as appropriate: allergies, current medications, past surgical history and problem list     Review of Systems  Pertinent items are noted in HPI       Objective     /64   Wt 59 4 kg (131 lb)   BMI 22 49 kg/m²    General:   appears stated age, cooperative, alert normal mood and affect   Neck: Neck: normal, supple,trachea midline, no masses   Heart: regular rate and rhythm, S1, S2 normal, no murmur, click, rub or gallop   Lungs: clear to auscultation bilaterally   Breasts: Inspection negative   Abdomen: soft, non-tender, without masses or organomegaly, surgical incision with excellent healing , sterile strips removed  Assessment/Plan       Patient in late postoperative period, after exploratory laparotomy, left salpingectomy, evacuation of pneumoperitoneum on 23  · Pathology : Left fallopian tube: Intraluminal immature chorionic villi compatible with an ectopic (tubal) pregnancy    · Patient denies anemia symptoms   · Denies vaginal bleeding   · Contraception:  After discussion of LARCs versus short acting contraception, she states do not desire a pregnancy for now but has declined contraception at this time, she will return back once ready to start contraception  · Patient bring paperwork for work absence proof since surgery, documentation handed to our clinic personal , documentation will be filled and patient will be notifided  once ready   · Follow-up in annual examination or before if patient desired previous examination      Patient D/W Chandu Browne MD

## 2023-05-25 DIAGNOSIS — O00.102 LEFT TUBAL PREGNANCY WITHOUT INTRAUTERINE PREGNANCY: Primary | ICD-10-CM

## 2023-05-26 ENCOUNTER — TELEPHONE (OUTPATIENT)
Dept: OBGYN CLINIC | Facility: CLINIC | Age: 28
End: 2023-05-26

## 2023-05-26 NOTE — TELEPHONE ENCOUNTER
Called patient to discuss results of her BHCG after recent ectopic pregnancy, her BHCG is currently 134, we plan to trend her BCHG until negative values  I have discussed with patient to repeat her levels next 5/29 or 5/30  Patient has been added to the B book for BHCG follow up      Luis Pereira

## 2023-05-30 ENCOUNTER — APPOINTMENT (OUTPATIENT)
Dept: LAB | Facility: CLINIC | Age: 28
End: 2023-05-30

## 2023-05-30 DIAGNOSIS — O00.102 LEFT TUBAL PREGNANCY WITHOUT INTRAUTERINE PREGNANCY: ICD-10-CM

## 2023-05-30 LAB — B-HCG SERPL-ACNC: 27 MIU/ML (ref 0–5)

## 2023-06-02 ENCOUNTER — TELEPHONE (OUTPATIENT)
Dept: OBGYN CLINIC | Facility: CLINIC | Age: 28
End: 2023-06-02

## 2023-06-02 DIAGNOSIS — O00.102 LEFT TUBAL PREGNANCY WITHOUT INTRAUTERINE PREGNANCY: Primary | ICD-10-CM

## 2023-06-09 ENCOUNTER — TELEPHONE (OUTPATIENT)
Dept: OBGYN CLINIC | Facility: CLINIC | Age: 28
End: 2023-06-09

## 2023-06-09 NOTE — TELEPHONE ENCOUNTER
----- Message from Blayne Rodney MD sent at 6/2/2023  5:36 PM EDT -----  Please call and inform patient her BCHG is trending down but still not negative, we ordered a new level for next week   Please let her now to go to the lab  Thanks

## 2023-06-15 ENCOUNTER — TELEPHONE (OUTPATIENT)
Dept: OBGYN CLINIC | Facility: CLINIC | Age: 28
End: 2023-06-15

## 2023-06-15 NOTE — TELEPHONE ENCOUNTER
----- Message from Chago Thomas MD sent at 6/2/2023  5:36 PM EDT -----  Please call and inform patient her BCHG is trending down but still not negative, we ordered a new level for next week   Please let her now to go to the lab  Thanks

## 2023-06-15 NOTE — TELEPHONE ENCOUNTER
Unable to reach pt today  Letter sent with results and recommendation to complete additional blood work

## 2023-06-23 ENCOUNTER — TELEPHONE (OUTPATIENT)
Dept: OBGYN CLINIC | Facility: CLINIC | Age: 28
End: 2023-06-23

## 2023-06-23 NOTE — TELEPHONE ENCOUNTER
Called patient, left  requesting for her to return to lab to have repeat HCG completed      [facility]   OBGYN PGY1

## 2023-06-27 ENCOUNTER — APPOINTMENT (OUTPATIENT)
Dept: LAB | Facility: CLINIC | Age: 28
End: 2023-06-27
Payer: COMMERCIAL

## 2023-06-27 DIAGNOSIS — O00.102 LEFT TUBAL PREGNANCY WITHOUT INTRAUTERINE PREGNANCY: ICD-10-CM

## 2023-06-27 LAB — B-HCG SERPL-ACNC: 1 MIU/ML (ref 0–5)

## 2023-06-27 PROCEDURE — 84702 CHORIONIC GONADOTROPIN TEST: CPT

## 2023-06-27 PROCEDURE — 36415 COLL VENOUS BLD VENIPUNCTURE: CPT

## (undated) DEVICE — PREMIUM DRY TRAY LF: Brand: MEDLINE INDUSTRIES, INC.

## (undated) DEVICE — INTENDED FOR TISSUE SEPARATION, AND OTHER PROCEDURES THAT REQUIRE A SHARP SURGICAL BLADE TO PUNCTURE OR CUT.: Brand: BARD-PARKER SAFETY BLADES SIZE 11, STERILE

## (undated) DEVICE — MAYO STAND COVER: Brand: CONVERTORS

## (undated) DEVICE — TOWEL SURG XR DETECT GREEN STRL RFD

## (undated) DEVICE — ADHESIVE SKIN HIGH VISCOSITY EXOFIN 1ML

## (undated) DEVICE — 3M™ STERI-STRIP™ REINFORCED ADHESIVE SKIN CLOSURES, R1547, 1/2 IN X 4 IN (12 MM X 100 MM), 6 STRIPS/ENVELOPE: Brand: 3M™ STERI-STRIP™

## (undated) DEVICE — BETHLEHEM UNIVERSAL GYN LAP PK: Brand: CARDINAL HEALTH

## (undated) DEVICE — DRAPE EQUIPMENT RF WAND

## (undated) DEVICE — CHLORHEXIDINE 4PCT 4 OZ

## (undated) DEVICE — CHLORAPREP HI-LITE 10.5ML ORANGE

## (undated) DEVICE — GLOVE INDICATOR PI UNDERGLOVE SZ 6.5 BLUE

## (undated) DEVICE — GLOVE PI ULTRA TOUCH SZ.6.5

## (undated) DEVICE — ENSEAL 20 CM SHAFT, LARGE JAW: Brand: ENSEAL X1

## (undated) DEVICE — SPONGE 4 X 4 XRAY 16 PLY STRL LF RFD